# Patient Record
Sex: MALE | Race: WHITE | NOT HISPANIC OR LATINO | Employment: FULL TIME | URBAN - METROPOLITAN AREA
[De-identification: names, ages, dates, MRNs, and addresses within clinical notes are randomized per-mention and may not be internally consistent; named-entity substitution may affect disease eponyms.]

---

## 2020-12-02 ENCOUNTER — LAB VISIT (OUTPATIENT)
Dept: LAB | Facility: HOSPITAL | Age: 45
End: 2020-12-02
Attending: INTERNAL MEDICINE
Payer: COMMERCIAL

## 2020-12-02 ENCOUNTER — OFFICE VISIT (OUTPATIENT)
Dept: INTERNAL MEDICINE | Facility: CLINIC | Age: 45
End: 2020-12-02
Payer: COMMERCIAL

## 2020-12-02 VITALS
OXYGEN SATURATION: 97 % | HEART RATE: 89 BPM | BODY MASS INDEX: 30.36 KG/M2 | HEIGHT: 73 IN | DIASTOLIC BLOOD PRESSURE: 72 MMHG | SYSTOLIC BLOOD PRESSURE: 118 MMHG | WEIGHT: 229.06 LBS

## 2020-12-02 DIAGNOSIS — K62.5 RECTAL BLEEDING: ICD-10-CM

## 2020-12-02 DIAGNOSIS — K62.5 RECTAL BLEEDING: Primary | ICD-10-CM

## 2020-12-02 LAB
BASOPHILS # BLD AUTO: 0.03 K/UL (ref 0–0.2)
BASOPHILS NFR BLD: 0.4 % (ref 0–1.9)
DIFFERENTIAL METHOD: ABNORMAL
EOSINOPHIL # BLD AUTO: 0.1 K/UL (ref 0–0.5)
EOSINOPHIL NFR BLD: 1.3 % (ref 0–8)
ERYTHROCYTE [DISTWIDTH] IN BLOOD BY AUTOMATED COUNT: 12.5 % (ref 11.5–14.5)
HCT VFR BLD AUTO: 45.2 % (ref 40–54)
HGB BLD-MCNC: 15.1 G/DL (ref 14–18)
IMM GRANULOCYTES # BLD AUTO: 0.02 K/UL (ref 0–0.04)
IMM GRANULOCYTES NFR BLD AUTO: 0.3 % (ref 0–0.5)
LYMPHOCYTES # BLD AUTO: 2.4 K/UL (ref 1–4.8)
LYMPHOCYTES NFR BLD: 34.5 % (ref 18–48)
MCH RBC QN AUTO: 29.8 PG (ref 27–31)
MCHC RBC AUTO-ENTMCNC: 33.4 G/DL (ref 32–36)
MCV RBC AUTO: 89 FL (ref 82–98)
MONOCYTES # BLD AUTO: 0.5 K/UL (ref 0.3–1)
MONOCYTES NFR BLD: 7 % (ref 4–15)
NEUTROPHILS # BLD AUTO: 3.9 K/UL (ref 1.8–7.7)
NEUTROPHILS NFR BLD: 56.5 % (ref 38–73)
NRBC BLD-RTO: 0 /100 WBC
PLATELET # BLD AUTO: 198 K/UL (ref 150–350)
PMV BLD AUTO: 9.1 FL (ref 9.2–12.9)
RBC # BLD AUTO: 5.07 M/UL (ref 4.6–6.2)
WBC # BLD AUTO: 6.96 K/UL (ref 3.9–12.7)

## 2020-12-02 PROCEDURE — 85025 COMPLETE CBC W/AUTO DIFF WBC: CPT

## 2020-12-02 PROCEDURE — 36415 COLL VENOUS BLD VENIPUNCTURE: CPT

## 2020-12-02 PROCEDURE — 1126F AMNT PAIN NOTED NONE PRSNT: CPT | Mod: S$GLB,,, | Performed by: INTERNAL MEDICINE

## 2020-12-02 PROCEDURE — 99202 PR OFFICE/OUTPT VISIT, NEW, LEVL II, 15-29 MIN: ICD-10-PCS | Mod: S$GLB,,, | Performed by: INTERNAL MEDICINE

## 2020-12-02 PROCEDURE — 3008F BODY MASS INDEX DOCD: CPT | Mod: CPTII,S$GLB,, | Performed by: INTERNAL MEDICINE

## 2020-12-02 PROCEDURE — 3008F PR BODY MASS INDEX (BMI) DOCUMENTED: ICD-10-PCS | Mod: CPTII,S$GLB,, | Performed by: INTERNAL MEDICINE

## 2020-12-02 PROCEDURE — 99202 OFFICE O/P NEW SF 15 MIN: CPT | Mod: S$GLB,,, | Performed by: INTERNAL MEDICINE

## 2020-12-02 PROCEDURE — 99999 PR PBB SHADOW E&M-NEW PATIENT-LVL III: ICD-10-PCS | Mod: PBBFAC,,, | Performed by: INTERNAL MEDICINE

## 2020-12-02 PROCEDURE — 99999 PR PBB SHADOW E&M-NEW PATIENT-LVL III: CPT | Mod: PBBFAC,,, | Performed by: INTERNAL MEDICINE

## 2020-12-02 PROCEDURE — 1126F PR PAIN SEVERITY QUANTIFIED, NO PAIN PRESENT: ICD-10-PCS | Mod: S$GLB,,, | Performed by: INTERNAL MEDICINE

## 2020-12-02 RX ORDER — HYDROCORTISONE ACETATE 25 MG/1
25 SUPPOSITORY RECTAL 2 TIMES DAILY
Qty: 20 SUPPOSITORY | Refills: 0 | Status: SHIPPED | OUTPATIENT
Start: 2020-12-02 | End: 2020-12-02 | Stop reason: SDUPTHER

## 2020-12-02 RX ORDER — HYDROCORTISONE ACETATE 25 MG/1
25 SUPPOSITORY RECTAL 2 TIMES DAILY
Qty: 6 SUPPOSITORY | Refills: 0 | Status: SHIPPED | OUTPATIENT
Start: 2020-12-02 | End: 2020-12-05

## 2020-12-02 NOTE — PROGRESS NOTES
45-year-old male  Started a month ago he has had episodes of rectal bleeding or bright red blood per rectum.  Hearing noticed bright red blood on top of the stool, not mixed in with the stool.  Is happen about couple times a week.  One time he notices soreness around the rectum.  There has been no change in bowel function and has been no difficulty with defecating.  There has been no abdominal pain , weight changes, nausea vomiting.    Medical history  No major health conditions    Family and social history is outlined in the med card    Examine examination  Weight 229 lb  Blood pressure 118/72  Pulse 88  Chest clear breath sounds  Heart regular rate rhythm  Abdominal exam nontender soft no hepatosplenomegaly abdominal masses  Rectal exam   no external lesions, mi openingld area of redness near the anterior aspect of the anal, no palpable lesions, stool is brown heme-negative, prostate normal    Impression  Rectal bleeding-bright red blood per rectum, probably due to internal hemorrhoid bleed    Plan  CBC  Referred to colon rectal surgery since it is recurring  Trial of Anusol HC suppository for few days

## 2020-12-30 ENCOUNTER — LAB VISIT (OUTPATIENT)
Dept: PRIMARY CARE CLINIC | Facility: OTHER | Age: 45
End: 2020-12-30
Attending: INTERNAL MEDICINE
Payer: COMMERCIAL

## 2020-12-30 DIAGNOSIS — Z03.818 ENCOUNTER FOR OBSERVATION FOR SUSPECTED EXPOSURE TO OTHER BIOLOGICAL AGENTS RULED OUT: ICD-10-CM

## 2020-12-30 PROCEDURE — U0003 INFECTIOUS AGENT DETECTION BY NUCLEIC ACID (DNA OR RNA); SEVERE ACUTE RESPIRATORY SYNDROME CORONAVIRUS 2 (SARS-COV-2) (CORONAVIRUS DISEASE [COVID-19]), AMPLIFIED PROBE TECHNIQUE, MAKING USE OF HIGH THROUGHPUT TECHNOLOGIES AS DESCRIBED BY CMS-2020-01-R: HCPCS

## 2020-12-31 LAB — SARS-COV-2 RNA RESP QL NAA+PROBE: NOT DETECTED

## 2021-09-29 ENCOUNTER — HOSPITAL ENCOUNTER (OUTPATIENT)
Dept: RADIOLOGY | Facility: HOSPITAL | Age: 46
Discharge: HOME | End: 2021-09-29
Attending: PHYSICIAN ASSISTANT
Payer: COMMERCIAL

## 2021-09-29 ENCOUNTER — OFFICE VISIT (OUTPATIENT)
Dept: SPORTS MEDICINE | Facility: CLINIC | Age: 46
End: 2021-09-29
Payer: COMMERCIAL

## 2021-09-29 VITALS
TEMPERATURE: 98 F | HEIGHT: 73 IN | DIASTOLIC BLOOD PRESSURE: 88 MMHG | HEART RATE: 85 BPM | WEIGHT: 224 LBS | BODY MASS INDEX: 29.69 KG/M2 | SYSTOLIC BLOOD PRESSURE: 135 MMHG

## 2021-09-29 DIAGNOSIS — M25.512 ACUTE PAIN OF LEFT SHOULDER: Primary | ICD-10-CM

## 2021-09-29 DIAGNOSIS — M25.512 LEFT SHOULDER PAIN, UNSPECIFIED CHRONICITY: ICD-10-CM

## 2021-09-29 DIAGNOSIS — M25.812 SHOULDER IMPINGEMENT, LEFT: ICD-10-CM

## 2021-09-29 PROCEDURE — 1159F PR MEDICATION LIST DOCUMENTED IN MEDICAL RECORD: ICD-10-PCS | Mod: CPTII,S$GLB,, | Performed by: PHYSICIAN ASSISTANT

## 2021-09-29 PROCEDURE — 73030 X-RAY EXAM OF SHOULDER: CPT | Mod: 26,LT,, | Performed by: RADIOLOGY

## 2021-09-29 PROCEDURE — 99204 OFFICE O/P NEW MOD 45 MIN: CPT | Mod: S$GLB,,, | Performed by: PHYSICIAN ASSISTANT

## 2021-09-29 PROCEDURE — 3075F SYST BP GE 130 - 139MM HG: CPT | Mod: CPTII,S$GLB,, | Performed by: PHYSICIAN ASSISTANT

## 2021-09-29 PROCEDURE — 1160F PR REVIEW ALL MEDS BY PRESCRIBER/CLIN PHARMACIST DOCUMENTED: ICD-10-PCS | Mod: CPTII,S$GLB,, | Performed by: PHYSICIAN ASSISTANT

## 2021-09-29 PROCEDURE — 3079F PR MOST RECENT DIASTOLIC BLOOD PRESSURE 80-89 MM HG: ICD-10-PCS | Mod: CPTII,S$GLB,, | Performed by: PHYSICIAN ASSISTANT

## 2021-09-29 PROCEDURE — 99204 PR OFFICE/OUTPT VISIT, NEW, LEVL IV, 45-59 MIN: ICD-10-PCS | Mod: S$GLB,,, | Performed by: PHYSICIAN ASSISTANT

## 2021-09-29 PROCEDURE — 73030 X-RAY EXAM OF SHOULDER: CPT | Mod: TC,LT

## 2021-09-29 PROCEDURE — 73030 XR SHOULDER COMPLETE 2 OR MORE VIEWS LEFT: ICD-10-PCS | Mod: 26,LT,, | Performed by: RADIOLOGY

## 2021-09-29 PROCEDURE — 99999 PR PBB SHADOW E&M-EST. PATIENT-LVL III: ICD-10-PCS | Mod: PBBFAC,,, | Performed by: PHYSICIAN ASSISTANT

## 2021-09-29 PROCEDURE — 1160F RVW MEDS BY RX/DR IN RCRD: CPT | Mod: CPTII,S$GLB,, | Performed by: PHYSICIAN ASSISTANT

## 2021-09-29 PROCEDURE — 3008F BODY MASS INDEX DOCD: CPT | Mod: CPTII,S$GLB,, | Performed by: PHYSICIAN ASSISTANT

## 2021-09-29 PROCEDURE — 3008F PR BODY MASS INDEX (BMI) DOCUMENTED: ICD-10-PCS | Mod: CPTII,S$GLB,, | Performed by: PHYSICIAN ASSISTANT

## 2021-09-29 PROCEDURE — 3079F DIAST BP 80-89 MM HG: CPT | Mod: CPTII,S$GLB,, | Performed by: PHYSICIAN ASSISTANT

## 2021-09-29 PROCEDURE — 1159F MED LIST DOCD IN RCRD: CPT | Mod: CPTII,S$GLB,, | Performed by: PHYSICIAN ASSISTANT

## 2021-09-29 PROCEDURE — 99999 PR PBB SHADOW E&M-EST. PATIENT-LVL III: CPT | Mod: PBBFAC,,, | Performed by: PHYSICIAN ASSISTANT

## 2021-09-29 PROCEDURE — 3075F PR MOST RECENT SYSTOLIC BLOOD PRESS GE 130-139MM HG: ICD-10-PCS | Mod: CPTII,S$GLB,, | Performed by: PHYSICIAN ASSISTANT

## 2021-10-06 ENCOUNTER — HOSPITAL ENCOUNTER (OUTPATIENT)
Dept: RADIOLOGY | Facility: HOSPITAL | Age: 46
Discharge: HOME | End: 2021-10-06
Attending: PHYSICIAN ASSISTANT
Payer: COMMERCIAL

## 2021-10-06 DIAGNOSIS — M25.512 ACUTE PAIN OF LEFT SHOULDER: ICD-10-CM

## 2021-10-06 PROCEDURE — G1004 CDSM NDSC: HCPCS

## 2021-10-06 PROCEDURE — 73221 MRI SHOULDER WITHOUT CONTRAST LEFT: ICD-10-PCS | Mod: 26,LT,, | Performed by: INTERNAL MEDICINE

## 2021-10-06 PROCEDURE — 73221 MRI JOINT UPR EXTREM W/O DYE: CPT | Mod: 26,LT,, | Performed by: INTERNAL MEDICINE

## 2021-10-13 ENCOUNTER — TELEPHONE (OUTPATIENT)
Dept: SPORTS MEDICINE | Facility: CLINIC | Age: 46
End: 2021-10-13

## 2021-10-13 ENCOUNTER — PATIENT MESSAGE (OUTPATIENT)
Dept: SPORTS MEDICINE | Facility: CLINIC | Age: 46
End: 2021-10-13
Payer: COMMERCIAL

## 2021-10-18 ENCOUNTER — PATIENT MESSAGE (OUTPATIENT)
Dept: INTERNAL MEDICINE | Facility: CLINIC | Age: 46
End: 2021-10-18
Payer: COMMERCIAL

## 2021-11-03 DIAGNOSIS — Z01.818 PRE-OP TESTING: Primary | ICD-10-CM

## 2021-11-04 DIAGNOSIS — S46.012A TRAUMATIC TEAR OF LEFT ROTATOR CUFF, UNSPECIFIED TEAR EXTENT, INITIAL ENCOUNTER: Primary | ICD-10-CM

## 2021-11-04 DIAGNOSIS — M75.20 BICEPS TENDINITIS, UNSPECIFIED LATERALITY: ICD-10-CM

## 2021-11-04 DIAGNOSIS — M19.90 OSTEOARTHRITIS, UNSPECIFIED OSTEOARTHRITIS TYPE, UNSPECIFIED SITE: ICD-10-CM

## 2021-11-04 DIAGNOSIS — S42.252A CLOSED DISPLACED FRACTURE OF GREATER TUBEROSITY OF LEFT HUMERUS, INITIAL ENCOUNTER: ICD-10-CM

## 2021-11-04 DIAGNOSIS — S43.432A SUPERIOR GLENOID LABRUM LESION OF LEFT SHOULDER, INITIAL ENCOUNTER: ICD-10-CM

## 2021-12-16 ENCOUNTER — PATIENT MESSAGE (OUTPATIENT)
Dept: PREADMISSION TESTING | Facility: HOSPITAL | Age: 46
End: 2021-12-16
Payer: COMMERCIAL

## 2021-12-16 NOTE — ANESTHESIA PAT ROS NOTE
12/16/2021  Maxwell Echeverria is a 46 y.o., male.    Pre-op Assessment          Review of Systems     Planned Procedure: Procedure(s) (LRB):  REPAIR, ROTATOR CUFF, ARTHROSCOPIC (Left)  FIXATION, TENDON (Left)  DEBRIDEMENT, SHOULDER, ARTHROSCOPIC (Left)  MICROFRACTURE (Left)  ORIF, FRACTURE, HUMERUS, PROXIMAL (Left)  Requested Anesthesia Type:General  Surgeon: Marly Heart MD  Service: Orthopedics  Known or anticipated Date of Surgery:1/4/2022    Previous anesthesia records:None on file    PCP pending     6'1  224#

## 2021-12-24 ENCOUNTER — PATIENT MESSAGE (OUTPATIENT)
Dept: SPORTS MEDICINE | Facility: CLINIC | Age: 46
End: 2021-12-24
Payer: COMMERCIAL

## 2021-12-27 ENCOUNTER — PATIENT MESSAGE (OUTPATIENT)
Dept: PREADMISSION TESTING | Facility: HOSPITAL | Age: 46
End: 2021-12-27
Payer: COMMERCIAL

## 2021-12-28 ENCOUNTER — PATIENT MESSAGE (OUTPATIENT)
Dept: SPORTS MEDICINE | Facility: CLINIC | Age: 46
End: 2021-12-28
Payer: COMMERCIAL

## 2021-12-29 DIAGNOSIS — Z01.818 PRE-OP TESTING: Primary | ICD-10-CM

## 2022-01-03 ENCOUNTER — OFFICE VISIT (OUTPATIENT)
Dept: SPORTS MEDICINE | Facility: CLINIC | Age: 47
End: 2022-01-03
Payer: COMMERCIAL

## 2022-01-03 VITALS
WEIGHT: 234 LBS | SYSTOLIC BLOOD PRESSURE: 152 MMHG | HEART RATE: 57 BPM | DIASTOLIC BLOOD PRESSURE: 92 MMHG | BODY MASS INDEX: 31.01 KG/M2 | HEIGHT: 73 IN

## 2022-01-03 DIAGNOSIS — S46.012A TRAUMATIC TEAR OF LEFT ROTATOR CUFF, UNSPECIFIED TEAR EXTENT, INITIAL ENCOUNTER: Primary | ICD-10-CM

## 2022-01-03 DIAGNOSIS — G89.18 POST-OPERATIVE PAIN: ICD-10-CM

## 2022-01-03 DIAGNOSIS — M75.20 BICEPS TENDINITIS, UNSPECIFIED LATERALITY: ICD-10-CM

## 2022-01-03 DIAGNOSIS — S43.432A SUPERIOR GLENOID LABRUM LESION OF LEFT SHOULDER, INITIAL ENCOUNTER: ICD-10-CM

## 2022-01-03 DIAGNOSIS — M19.90 OSTEOARTHRITIS, UNSPECIFIED OSTEOARTHRITIS TYPE, UNSPECIFIED SITE: ICD-10-CM

## 2022-01-03 LAB
CTP QC/QA: YES
SARS-COV-2 RDRP RESP QL NAA+PROBE: NEGATIVE

## 2022-01-03 PROCEDURE — 3008F BODY MASS INDEX DOCD: CPT | Mod: CPTII,S$GLB,, | Performed by: ORTHOPAEDIC SURGERY

## 2022-01-03 PROCEDURE — 3077F PR MOST RECENT SYSTOLIC BLOOD PRESSURE >= 140 MM HG: ICD-10-PCS | Mod: CPTII,S$GLB,, | Performed by: ORTHOPAEDIC SURGERY

## 2022-01-03 PROCEDURE — 3080F DIAST BP >= 90 MM HG: CPT | Mod: CPTII,S$GLB,, | Performed by: ORTHOPAEDIC SURGERY

## 2022-01-03 PROCEDURE — 3008F PR BODY MASS INDEX (BMI) DOCUMENTED: ICD-10-PCS | Mod: CPTII,S$GLB,, | Performed by: ORTHOPAEDIC SURGERY

## 2022-01-03 PROCEDURE — 99214 OFFICE O/P EST MOD 30 MIN: CPT | Mod: 57,S$GLB,, | Performed by: ORTHOPAEDIC SURGERY

## 2022-01-03 PROCEDURE — 3080F PR MOST RECENT DIASTOLIC BLOOD PRESSURE >= 90 MM HG: ICD-10-PCS | Mod: CPTII,S$GLB,, | Performed by: ORTHOPAEDIC SURGERY

## 2022-01-03 PROCEDURE — 99999 PR PBB SHADOW E&M-EST. PATIENT-LVL III: CPT | Mod: PBBFAC,,, | Performed by: ORTHOPAEDIC SURGERY

## 2022-01-03 PROCEDURE — 1160F PR REVIEW ALL MEDS BY PRESCRIBER/CLIN PHARMACIST DOCUMENTED: ICD-10-PCS | Mod: CPTII,S$GLB,, | Performed by: ORTHOPAEDIC SURGERY

## 2022-01-03 PROCEDURE — 1159F PR MEDICATION LIST DOCUMENTED IN MEDICAL RECORD: ICD-10-PCS | Mod: CPTII,S$GLB,, | Performed by: ORTHOPAEDIC SURGERY

## 2022-01-03 PROCEDURE — 1159F MED LIST DOCD IN RCRD: CPT | Mod: CPTII,S$GLB,, | Performed by: ORTHOPAEDIC SURGERY

## 2022-01-03 PROCEDURE — 99214 PR OFFICE/OUTPT VISIT, EST, LEVL IV, 30-39 MIN: ICD-10-PCS | Mod: 57,S$GLB,, | Performed by: ORTHOPAEDIC SURGERY

## 2022-01-03 PROCEDURE — 1160F RVW MEDS BY RX/DR IN RCRD: CPT | Mod: CPTII,S$GLB,, | Performed by: ORTHOPAEDIC SURGERY

## 2022-01-03 PROCEDURE — 3077F SYST BP >= 140 MM HG: CPT | Mod: CPTII,S$GLB,, | Performed by: ORTHOPAEDIC SURGERY

## 2022-01-03 PROCEDURE — 99999 PR PBB SHADOW E&M-EST. PATIENT-LVL III: ICD-10-PCS | Mod: PBBFAC,,, | Performed by: ORTHOPAEDIC SURGERY

## 2022-01-03 RX ORDER — OXYCODONE HCL 10 MG/1
10 TABLET, FILM COATED, EXTENDED RELEASE ORAL
Status: CANCELLED | OUTPATIENT
Start: 2022-01-03 | End: 2022-01-03

## 2022-01-03 RX ORDER — ASPIRIN 81 MG/1
81 TABLET ORAL DAILY
Qty: 28 TABLET | Refills: 0 | COMMUNITY
Start: 2022-01-03 | End: 2022-10-24

## 2022-01-03 RX ORDER — PREGABALIN 75 MG/1
75 CAPSULE ORAL
Status: CANCELLED | OUTPATIENT
Start: 2022-01-03 | End: 2022-01-03

## 2022-01-03 RX ORDER — PROMETHAZINE HYDROCHLORIDE 25 MG/1
25 TABLET ORAL EVERY 6 HOURS PRN
Qty: 12 TABLET | Refills: 0 | Status: SHIPPED | OUTPATIENT
Start: 2022-01-03 | End: 2022-10-24

## 2022-01-03 RX ORDER — CLINDAMYCIN PHOSPHATE 900 MG/50ML
900 INJECTION, SOLUTION INTRAVENOUS
Status: CANCELLED | OUTPATIENT
Start: 2022-01-03

## 2022-01-03 RX ORDER — TRAMADOL HYDROCHLORIDE 50 MG/1
50-100 TABLET ORAL EVERY 6 HOURS PRN
Qty: 21 TABLET | Refills: 0 | Status: SHIPPED | OUTPATIENT
Start: 2022-01-03 | End: 2022-10-24

## 2022-01-03 RX ORDER — SODIUM CHLORIDE 9 MG/ML
INJECTION, SOLUTION INTRAVENOUS CONTINUOUS
Status: CANCELLED | OUTPATIENT
Start: 2022-01-03

## 2022-01-03 RX ORDER — OXYCODONE AND ACETAMINOPHEN 10; 325 MG/1; MG/1
TABLET ORAL
Qty: 21 TABLET | Refills: 0 | Status: SHIPPED | OUTPATIENT
Start: 2022-01-03 | End: 2022-10-24

## 2022-01-03 NOTE — PROGRESS NOTES
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   RHD     He reports that his shoulder pain began 2.5 weeks ago following lifting and moving a 30lb case of liquor. He felt immediate sharp pain in his shoulder and reports that his shoulder gave out of him. Since then he reports anterior and lateral sharp stabbing pain that is made worse with activity. Pain is keeping him awake at night. Not improving with conservative management.  He has been doing ice compresses with no significant pain relief.     He reports that the pain is worse with overhead and lateral activity. It also bothers him at night.    SANE 30-40    He reports that prior to this injury (4-5 months ago) he noted a hard bone protrusion around his AC joint of his left shoulder that did hurt him for a few days in the past. It is not currently hurting him but he is unsure if this is related to current problem.     Previous HS swimmer.       PAST MEDICAL HISTORY: No past medical history on file.  PAST SURGICAL HISTORY: No past surgical history on file.  FAMILY HISTORY:   Family History   Problem Relation Age of Onset    Diabetes Father     Hypertension Father     Cancer Brother         testicular     SOCIAL HISTORY:   Social History     Socioeconomic History    Marital status:    Tobacco Use    Smoking status: Never Smoker    Smokeless tobacco: Never Used   Substance and Sexual Activity    Alcohol use: Yes     Comment: 5 drinks weekend       MEDICATIONS: No current outpatient medications on file.  ALLERGIES: Review of patient's allergies indicates:  No Known Allergies    VITAL SIGNS: BP (!) 152/92   Pulse (!) 57   Ht 6' 1" (1.854 m)   Wt 106.1 kg (234 lb)   BMI 30.87 kg/m²      Review of Systems   Constitution: Negative. Negative for chills, fever and night sweats.   HENT: Negative for congestion and " headaches.    Eyes: Negative for blurred vision, left vision loss and right vision loss.   Cardiovascular: Negative for chest pain and syncope.   Respiratory: Negative for cough and shortness of breath.    Endocrine: Negative for polydipsia, polyphagia and polyuria.   Hematologic/Lymphatic: Negative for bleeding problem. Does not bruise/bleed easily.   Skin: Negative for dry skin, itching and rash.   Musculoskeletal: Negative for falls and muscle weakness.   Gastrointestinal: Negative for abdominal pain and bowel incontinence.   Genitourinary: Negative for bladder incontinence and nocturia.   Neurological: Negative for disturbances in coordination, loss of balance and seizures.   Psychiatric/Behavioral: Negative for depression. The patient does not have insomnia.    Allergic/Immunologic: Negative for hives and persistent infections.       PHYSICAL EXAMINATION:  General:  The patient is alert and oriented x 3.  Mood is pleasant.  Observation of ears, eyes and nose reveal no gross abnormalities.  HEENT: NCAT, sclera nonicteric  Lungs: Respirations are equal and unlabored.  Gait is coordinated. Patient can toe walk and heel walk without difficulty.  Cardiovascular: There are no swelling or varicosities present.   Lymphatic: Negative for adenopathy       left SHOULDER / UPPER EXTREMITY EXAM    OBSERVATION:     Swelling  none  Deformity  none   Discoloration  none   Scapular winging none   Scars   none  Atrophy  none    TENDERNESS / CREPITUS (T/C):          T/C      T/C   Clavicle   -/-  SUPRAspinatus    -/-   AC Jt.    - pain today with hypertrophy noted/-     INFRAspinatus  -/-   SC Jt.    -/-  Deltoid    -/-   G. Tuberosity  -/-  LH BICEP groove  +/-   Acromion:  -/-  Midline Neck   -/-   Scapular Spine -/-  Trapezium   -/-   SMA Scapula  -/-  GH jt. line - post  +/-   Scapulothoracic  -/-         ROM: (* = with pain)  Right shoulder   Left shoulder        AROM (PROM)   AROM (PROM)   FE    170° (175°)     170°  (175°) *    ER at 0°    60°  (65°)    60°  (65°) *   ER at 90° ABD  90°  (90°)    90°  (90°)*   IR at 90°  ABD   NA  (40°)     NA  (40°)      IR (spine level)   T10     T12*    STRENGTH: (* = with pain) RIGHT SHOULDER  LEFT SHOULDER   SCAPTION at 0 *  5/5    5-/5*    SCAPTION at 30 *  5/5    5/5*    IR    5/5    5/5   ER    5/5    5/5   BICEPS   5/5    5/5   Deltoid    5/5    5/5    Bear hug test    + for pain and weakness 4/5 on left and 5/5 on right     SIGNS:  Painful side       NEER   +    OCELESTINES  +   MERINO   +    SPEEDS  +   DROP ARM   neg   BELLY PRESS Neg   Superior escape none    LIFT-OFF  Neg   X-Body ADD    neg    MOVING VALGUS Neg      STABILITY TESTING    RIGHT SHOULDER   LEFT SHOULDER       Translation    Anterior  up face     up face    Posterior  up face    up face    Sulcus   < 10mm    < 10 mm        EXTREMITY NEURO-VASCULAR EXAM    Sensation grossly intact to light touch all dermatomal regions.    DTR 2+ Biceps, Triceps, BR and Negative Graces sign   Grossly intact motor function at Elbow, Wrist and Hand   Distal pulses radial and ulnar 2+, brisk cap refill, symmetric.      NECK:  Painless FROM and spinous processes non-tender. Negative Spurlings sign.      OTHER FINDINGS:    XRAYS:  Shoulder trauma series left,  were ordered and reviewed by me. No convincing fracture or dislocation is noted. The osseous structures appear well mineralized and well aligned, possible insufficiency fracture greater tuberosity/cystic changes, +AC moderate hypertrophy, mild degenerative changes    left   1. Shoulder pain,possible RTC tear    2.  Possible insufficiency fracture greater tuberosity    Concern for subscapularis tear and supraspinatus tear    Plan:       ASSESSMENT:  shoulder pain.    I do think that this is likely a rotator cuff tear, possible insufficiency fracture greater tuberosity.    I have recommended we check an MRI of the shoulder to evaluate this.    Depending on the results of the MRI,  we may consider a cortisone   injection and physical therapy and/or arthroscopic intervention and treatment   depending on what we find.  I will see him back upon its completion or PHREV and we will consider the above.    left   1. Shoulder pain,possible medium size partial rotator cuff tear   2.  Possible insufficiency fracture greater tuberosity    MRI Left shoulder: Medium size partial thickness supraspinatus tear without atrophy or retraction. Subscapularis tendinopathy, SLAP, biceps tendinitis, + insufficiency fracture greater tuberosity, +AC moderate hypertrophy, labrum degenerative changes.      Full-thickness fissuring and flap formation involving the anteroinferior glenoid cartilage.  1.4 cm full-thickness cartilage defect over the posterior humeral head.   Labral and cartilage abnormalities are suggestive of prior anterior shoulder dislocation.    MRI reviewed and discussed with Dr. Heart. Surgical plan developed.      ASSESSMENT:    Left Shoulder Rotator Cuff Tear, SLAP, biceps tendonitis.      he would benefit from an arthroscopy, given the above.       PLAN:   Left Shoulder rotator cuff tear     All questions were answered, pt will contact us for questions or concerns in the interim.  Had thorough discussion of non-operative vs operative intervention, and risks and benefits of both.     We have discussed the surgery and recovery of arthroscopic shoulder surgery. he understands that there may be limited mobility up to several weeks after surgery depending on procedures that are performed at the time of surgery.    Surgical and non-surgical treatment options discussed along with risks and benefits of each. Patient has chosen surgical management.     The spectrum of treatment options were discussed with the patient, including nonoperative and operative options.  After thorough discussion, the patient has elected to undergo surgical treatment to include:    left   a. Shoulder arthroscopic rotator cuff  debridement vs repair with Cuffmend   b. Shoulder arthroscopic SAD   c. Shoulder arthroscopic extensive debridement   d. Shoulder arthroscopic biceps tenodesis (vs. subpect tenodesis)   e. Shoulder arthroscopic possible microfracture   f. possible Shoulder open reduction internal fixation greater tuberosity    The details of the surgical procedure were explained, including the location of probable incisions and a description of likely hardware and/or grafts to be used.  The patient understands the likely convalescence after surgery.  Also, we have thoroughly discussed the risks, benefits and alternatives to surgery, including, but not limited to, the risk of infection, joint stiffness, blood clot (including DVT and/or pulmonary embolus), neurologic and vascular injury.  It was explained that, if tissue has been repaired or reconstructed, there is a chance of failure, which may require further management.  Consent form for surgery is signed and in chart.    These risks include but are not limited to: bleeding, infection, scarring, re-tear of repair, irreparability of the tear, damage to neurovascular structures, damage to cartilage, stiffness, blood clots, pulmonary embolism, swelling, compartment syndrome, need for further surgery, and the risks of anesthesia. She verbalized her understanding of these risks and wished to proceed with surgery.   Time was spent face-to-face with the patient during this encounter on counseling about treatment options including surgery and coordination of her care for preoperative visits, surgery and post-operative rehab.

## 2022-01-03 NOTE — H&P (VIEW-ONLY)
Maxwell Echeverria  is here for a completion of his perioperative paperwork. he  Is scheduled to undergo     left              a. Shoulder arthroscopic rotator cuff debridement vs repair with Cuffmend              b. Shoulder arthroscopic SAD              c. Shoulder arthroscopic extensive debridement              d. Shoulder arthroscopic biceps tenodesis (vs. subpect tenodesis)              e. Shoulder arthroscopic possible microfracture              F. possible Shoulder open reduction internal fixation greater tuberosity on 1/4/21.      He is a healthy individual and does not need clearance for this procedure.     PAST MEDICAL HISTORY: History reviewed. No pertinent past medical history.  PAST SURGICAL HISTORY: History reviewed. No pertinent surgical history.  FAMILY HISTORY:   Family History   Problem Relation Age of Onset    Diabetes Father     Hypertension Father     Cancer Brother         testicular     SOCIAL HISTORY:   Social History     Socioeconomic History    Marital status:    Tobacco Use    Smoking status: Never Smoker    Smokeless tobacco: Never Used   Substance and Sexual Activity    Alcohol use: Yes     Comment: 5 drinks weekend       MEDICATIONS:   Current Outpatient Medications:     aspirin (ECOTRIN) 81 MG EC tablet, Take 1 tablet (81 mg total) by mouth once daily. For 4 weeks starting the day after surgery., Disp: 28 tablet, Rfl: 0    oxyCODONE-acetaminophen (PERCOCET)  mg per tablet, Take 1 tablet by mouth every 4-6 hours as needed for pain. Take stool softener with this medication., Disp: 21 tablet, Rfl: 0    promethazine (PHENERGAN) 25 MG tablet, Take 1 tablet (25 mg total) by mouth every 6 (six) hours as needed for Nausea., Disp: 12 tablet, Rfl: 0    traMADoL (ULTRAM) 50 mg tablet, Take 1-2 tablets ( mg total) by mouth every 6 (six) hours as needed for Pain., Disp: 21 tablet, Rfl: 0  ALLERGIES: Review of patient's allergies indicates:  No Known Allergies    VITAL SIGNS: BP  "(!) 152/92   Pulse (!) 57   Ht 6' 1" (1.854 m)   Wt 106.1 kg (234 lb)   BMI 30.87 kg/m²      Risks, indications and benefits of the surgical procedure were discussed with the patient. All questions with regard to surgery, rehab, expected return to functional activities, activities of daily living and recreational endeavors were answered to his satisfaction.    It was explained to the patient that there may be an increase in surgical risks if the patient has certain co-morbidities such as but not limited to: Obesity, Cardiovascular issues (CHF, CAD, Arrhythmias), chronic pulmonary issues, previous or current neurovascular/neurological issues, previous strokes, diabetes mellitus, previous wound healing issues, previous wound or skin infections, PVD, clotting disorders, if the patient uses chronic steroids, if the patient takes or has immune compromising medications or diseases, or has previously or currently used tobacco products.     The patient verbalized that he/she does not have any additional clotting, bleeding, or blood disorders, other than what is list in her chart on today's review.     Then a brief history and physical exam were performed.    Review of Systems   Constitution: Negative. Negative for chills, fever and night sweats.   HENT: Negative for congestion and headaches.    Eyes: Negative for blurred vision, left vision loss and right vision loss.   Cardiovascular: Negative for chest pain and syncope.   Respiratory: Negative for cough and shortness of breath.    Endocrine: Negative for polydipsia, polyphagia and polyuria.   Hematologic/Lymphatic: Negative for bleeding problem. Does not bruise/bleed easily.   Skin: Negative for dry skin, itching and rash.   Musculoskeletal: Negative for falls and muscle weakness.   Gastrointestinal: Negative for abdominal pain and bowel incontinence.   Genitourinary: Negative for bladder incontinence and nocturia.   Neurological: Negative for disturbances in " coordination, loss of balance and seizures.   Psychiatric/Behavioral: Negative for depression. The patient does not have insomnia.    Allergic/Immunologic: Negative for hives and persistent infections.     PHYSICAL EXAM:  GEN: A&Ox3, WD WN NAD  HEENT: WNL  CHEST: CTAB, no W/R/R  HEART: RRR, no M/R/G  ABD: Soft, NT ND, BS x4 QUADS  MS; See Epic  NEURO: CN II-XII intact       The surgical consent was then reviewed with the patient, who agreed with all the contents of the consent form and it was signed. he was then given the surgery packet to bring with him to [facility] [place] for the anesthesia portion of his perioperative paperwork (if needed)   For all physicians except for Dr. An, we will email and possibly fax the consent forms and booking sheets to [facility].    The patient was given the opportunity to ask questions about the surgical plan and consent form, and once no other questions were asked, I proceeded with the pre-op appointment.    PHYSICAL THERAPY:  He was also instructed regarding physical therapy and will begin on  Likely 7-10 days. He was given a copy of the original prescription to schedule. Another copy of this prescription was also faxed to [facility].    POST OP CARE:instructions were reviewed including care of the wound and dressing after surgery and when he can shower. Patient was told not sleep or lay on there surgical extremity following surgery as this could cause repair damage, tissue damage, or nerve injury.    An extensive amount of time was spent on discussion of the following information based on what type of surgery the patient was having. Patient expressed understanding of the material below:    Shoulder surgery or upper extremity surgery requiring post-op sling:  It was explained to the patient that they should remove their arm from the sling approximately 6 times per day to do full elbow ROM (flexion and extension) and full supination and pronation of the  elbow for approximately 5 minutes at a time to help prevent elbow stiffness, nerve pain or problems, or nerve injury. They were told to contact us if they begin having numbness and tingling of there surgical extremity that persists longer then 1 day without relief.     Extremity surgery requiring a splint:   It was explained to patient on how to properly elevated position there extremity to prevent pressure ulcers from occurring. I made sure that the patient understood that that surgical site may be numb following surgery and prevent them from feeling pressure pain that they would normal feel if a pressure injury was occurring. Pressure ulcers and there causes were discussed with the patient today.     Post-operative splint:  It was explain to the patient that they can contact us at anytime if they feel that there is a problem with their splint or under their splint that needs evaluation. If there is concern, questions, or discomfort with the splint then they can present to either our clinic or the [facility] ED for removal, evaluation, and replacement of the splint.    CRUTCHES OR WALKER: It was explained to the patient that if they are having a lower extremity surgery that they will require either a walker or crutches to ambulate safely with after surgery. It was explained that a cane or other assistive devices are not sufficient to safely ambulate with after surgery. I explained to the patient that I will place an order for them to receive either crutches or a walker after surgery to go home with. It was explained that if they have crutches or a walker at home already, that they are REQUIRED to bring them to the hospital on the day of surgery. It was explained that if they do not have them at the hospital on the day of surgery that they WILL be provided a new pair or crutches or a walker to go home with to ensure ambulation will be safe if the patient needs to stop somewhere on the way home.      PAIN  MANAGEMENT: Maxwell Echeverria was also given a pain management regime, which includes the TENS unit given to him by [facility] along with the education required for its use. He was also instructed regarding the Polar ice unit that will be in place after surgery and his postoperative pain medications.     PAIN MEDICATION:  Percocet 10/325mg 1 po q 4-6 hours prn pain  Ultram 50 mg Take 1-2 p.o. q.6 hours p.r.n. breakthrough pain,   Phenergan 25 mg one p.o. q.6 hours p.r.n. nausea and vomiting.    DVT prophylaxis was discussed with the patient today including risk factors for developing DVTs and history of DVTs. The patient was asked if any specific recommendations were given from the doctor/s that did pre-operative surgical clearance. The patient was then given an education sheet about DVTs and PE with warning signs and symptoms of both and steps to take if they suspect either of these.    This along with the Modified Caprini risk assessment model for VTE in general surgical patients was used to determine the patient's DVT risk.     From: Yvette MK, Sonido DA, Erika SM, et al. Prevention of VTE in nonorthopedic surgical patients: antithrombotic therapy and prevention of thrombosis, 9th ed: American College of Chest [employer]icians evidence-based clinical practical guidelines. Chest 2012; 141:e227S. Copyright © 2012. Reproduced with permission from the American College of Chest Physicians.    The below listed DVT prophylaxis regimen along with bilateral TESSIE compression stockings will be used post-op. Length of treatment has been determined to be 10-42 days post-op by the above noted Caprini assessment model.     The patient was instructed to buy and take:  Aspirin 81mg QD x 4 weeks for DVT prophylaxis starting on the morning after surgery.  Patient will also use bilateral TEDs on lower extremities, SCDs during surgery, and early ambulation post-op. If the patient was previously taking 81mg baby aspirin, they were told to not take it  starting 5 days prior to surgery and to restart the 81mg aspirin after surgery.       Patient was also told to buy over the counter Prilosec medication if needed and take it once daily for GI protection as long as they are taking NSAIDs or Aspirin.    Patient denies history of seizures.       The patient was told that narcotic pain medications may make them drowsy and instructions were given to not sign legal documents, drive or operate heavy machinery, cars, or equipment while under the influence of narcotic medications. The patient was told and understands that narcotic pain medications should only be used as needed to control pain and that other options of pain control include TENs unit and ice packs/unit.     As there were no other questions to be asked, he was given my business card along with Marly Heart MD business card if he has any questions or concerns prior to surgery or in the postop period.

## 2022-01-03 NOTE — H&P
Maxwell Echeverria  is here for a completion of his perioperative paperwork. he  Is scheduled to undergo     left              a. Shoulder arthroscopic rotator cuff debridement vs repair with Cuffmend              b. Shoulder arthroscopic SAD              c. Shoulder arthroscopic extensive debridement              d. Shoulder arthroscopic biceps tenodesis (vs. subpect tenodesis)              e. Shoulder arthroscopic possible microfracture              F. possible Shoulder open reduction internal fixation greater tuberosity on 1/4/21.      He is a healthy individual and does not need clearance for this procedure.     PAST MEDICAL HISTORY: History reviewed. No pertinent past medical history.  PAST SURGICAL HISTORY: History reviewed. No pertinent surgical history.  FAMILY HISTORY:   Family History   Problem Relation Age of Onset    Diabetes Father     Hypertension Father     Cancer Brother         testicular     SOCIAL HISTORY:   Social History     Socioeconomic History    Marital status:    Tobacco Use    Smoking status: Never Smoker    Smokeless tobacco: Never Used   Substance and Sexual Activity    Alcohol use: Yes     Comment: 5 drinks weekend       MEDICATIONS:   Current Outpatient Medications:     aspirin (ECOTRIN) 81 MG EC tablet, Take 1 tablet (81 mg total) by mouth once daily. For 4 weeks starting the day after surgery., Disp: 28 tablet, Rfl: 0    oxyCODONE-acetaminophen (PERCOCET)  mg per tablet, Take 1 tablet by mouth every 4-6 hours as needed for pain. Take stool softener with this medication., Disp: 21 tablet, Rfl: 0    promethazine (PHENERGAN) 25 MG tablet, Take 1 tablet (25 mg total) by mouth every 6 (six) hours as needed for Nausea., Disp: 12 tablet, Rfl: 0    traMADoL (ULTRAM) 50 mg tablet, Take 1-2 tablets ( mg total) by mouth every 6 (six) hours as needed for Pain., Disp: 21 tablet, Rfl: 0  ALLERGIES: Review of patient's allergies indicates:  No Known Allergies    VITAL SIGNS: BP  "(!) 152/92   Pulse (!) 57   Ht 6' 1" (1.854 m)   Wt 106.1 kg (234 lb)   BMI 30.87 kg/m²      Risks, indications and benefits of the surgical procedure were discussed with the patient. All questions with regard to surgery, rehab, expected return to functional activities, activities of daily living and recreational endeavors were answered to his satisfaction.    It was explained to the patient that there may be an increase in surgical risks if the patient has certain co-morbidities such as but not limited to: Obesity, Cardiovascular issues (CHF, CAD, Arrhythmias), chronic pulmonary issues, previous or current neurovascular/neurological issues, previous strokes, diabetes mellitus, previous wound healing issues, previous wound or skin infections, PVD, clotting disorders, if the patient uses chronic steroids, if the patient takes or has immune compromising medications or diseases, or has previously or currently used tobacco products.     The patient verbalized that he/she does not have any additional clotting, bleeding, or blood disorders, other than what is list in her chart on today's review.     Then a brief history and physical exam were performed.    Review of Systems   Constitution: Negative. Negative for chills, fever and night sweats.   HENT: Negative for congestion and headaches.    Eyes: Negative for blurred vision, left vision loss and right vision loss.   Cardiovascular: Negative for chest pain and syncope.   Respiratory: Negative for cough and shortness of breath.    Endocrine: Negative for polydipsia, polyphagia and polyuria.   Hematologic/Lymphatic: Negative for bleeding problem. Does not bruise/bleed easily.   Skin: Negative for dry skin, itching and rash.   Musculoskeletal: Negative for falls and muscle weakness.   Gastrointestinal: Negative for abdominal pain and bowel incontinence.   Genitourinary: Negative for bladder incontinence and nocturia.   Neurological: Negative for disturbances in " coordination, loss of balance and seizures.   Psychiatric/Behavioral: Negative for depression. The patient does not have insomnia.    Allergic/Immunologic: Negative for hives and persistent infections.     PHYSICAL EXAM:  GEN: A&Ox3, WD WN NAD  HEENT: WNL  CHEST: CTAB, no W/R/R  HEART: RRR, no M/R/G  ABD: Soft, NT ND, BS x4 QUADS  MS; See Epic  NEURO: CN II-XII intact       The surgical consent was then reviewed with the patient, who agreed with all the contents of the consent form and it was signed. he was then given the surgery packet to bring with him to surgery [place] for the anesthesia portion of his perioperative paperwork (if needed)   For all physicians except for Dr. An, we will email and possibly fax the consent forms and booking sheets to [facility].    The patient was given the opportunity to ask questions about the surgical plan and consent form, and once no other questions were asked, I proceeded with the pre-op appointment.    PHYSICAL THERAPY:  He was also instructed regarding physical therapy and will begin on  Likely 7-10 days. He was given a copy of the original prescription to schedule. [facility]    POST OP CARE:instructions were reviewed including care of the wound and dressing after surgery and when he can shower. Patient was told not sleep or lay on there surgical extremity following surgery as this could cause repair damage, tissue damage, or nerve injury.    An extensive amount of time was spent on discussion of the following information based on what type of surgery the patient was having. Patient expressed understanding of the material below:    Shoulder surgery or upper extremity surgery requiring post-op sling:  It was explained to the patient that they should remove their arm from the sling approximately 6 times per day to do full elbow ROM (flexion and extension) and full supination and pronation of the  elbow for approximately 5 minutes at a time to help prevent elbow stiffness, nerve pain or problems, or nerve injury. They were told to contact us if they begin having numbness and tingling of there surgical extremity that persists longer then 1 day without relief.     Extremity surgery requiring a splint:   It was explained to patient on how to properly elevated position there extremity to prevent pressure ulcers from occurring. I made sure that the patient understood that that surgical site may be numb following surgery and prevent them from feeling pressure pain that they would normal feel if a pressure injury was occurring. Pressure ulcers and there causes were discussed with the patient today.     Post-operative splint:  It was explain to the patient that they can contact us at anytime if they feel that there is a problem with their splint or under their splint that needs evaluation. If there is concern, questions, or discomfort with the splint then they can present to either our clinic or the [facility] ED for removal, evaluation, and replacement of the splint.    CRUTCHES OR WALKER: It was explained to the patient that if they are having a lower extremity surgery that they will require either a walker or crutches to ambulate safely with after surgery. It was explained that a cane or other assistive devices are not sufficient to safely ambulate with after surgery. I explained to the patient that I will place an order for them to receive either crutches or a walker after surgery to go home with. It was explained that if they have crutches or a walker at home already, that they are REQUIRED to bring them to the hospital on the day of surgery. It was explained that if they do not have them at the hospital on the day of surgery that they WILL be provided a new pair or crutches or a walker to go home with to ensure ambulation will be safe if the patient needs to stop somewhere on the way home.      PAIN  MANAGEMENT: Maxwell Echeverria was also given a pain management regime, which includes the TENS unit given to him by [facility] along with the education required for its use. He was also instructed regarding the Polar ice unit that will be in place after surgery and his postoperative pain medications.     PAIN MEDICATION:  Percocet 10/325mg 1 po q 4-6 hours prn pain  Ultram 50 mg Take 1-2 p.o. q.6 hours p.r.n. breakthrough pain,   Phenergan 25 mg one p.o. q.6 hours p.r.n. nausea and vomiting.    DVT prophylaxis was discussed with the patient today including risk factors for developing DVTs and history of DVTs. The patient was asked if any specific recommendations were given from the doctor/s that did pre-operative surgical clearance. The patient was then given an education sheet about DVTs and PE with warning signs and symptoms of both and steps to take if they suspect either of these.    This along with the Modified Caprini risk assessment model for VTE in general surgical patients was used to determine the patient's DVT risk.     From: Yvette MK, Sonido DA, Erika SM, et al. Prevention of VTE in nonorthopedic surgical patients: antithrombotic therapy and prevention of thrombosis, 9th ed: American College of [employer]icians evidence-based clinical practical guidelines. Chest 2012; 141:e227S. Copyright © 2012. Reproduced with permission from the American College of Chest Physicians.    The below listed DVT prophylaxis regimen along with bilateral TESSIE compression stockings will be used post-op. Length of treatment has been determined to be 10-42 days post-op by the above noted Caprini assessment model.     The patient was instructed to buy and take:  Aspirin 81mg QD x 4 weeks for DVT prophylaxis starting on the morning after surgery.  Patient will also use bilateral TEDs on lower extremities, SCDs during surgery, and early ambulation post-op. If the patient was previously taking 81mg baby aspirin, they were told to not take it  starting 5 days prior to surgery and to restart the 81mg aspirin after surgery.       Patient was also told to buy over the counter Prilosec medication if needed and take it once daily for GI protection as long as they are taking NSAIDs or Aspirin.    Patient denies history of seizures.       The patient was told that narcotic pain medications may make them drowsy and instructions were given to not sign legal documents, drive or operate heavy machinery, cars, or equipment while under the influence of narcotic medications. The patient was told and understands that narcotic pain medications should only be used as needed to control pain and that other options of pain control include TENs unit and ice packs/unit.     As there were no other questions to be asked, he was given my business card along with Marly Heart MD business card if he has any questions or concerns prior to surgery or in the postop period.

## 2022-01-04 ENCOUNTER — ANESTHESIA (OUTPATIENT)
Dept: SURGERY | Facility: HOSPITAL | Age: 47
End: 2022-01-04
Payer: COMMERCIAL

## 2022-01-04 ENCOUNTER — ANESTHESIA EVENT (OUTPATIENT)
Dept: SURGERY | Facility: HOSPITAL | Age: 47
End: 2022-01-04
Payer: COMMERCIAL

## 2022-01-04 ENCOUNTER — HOSPITAL ENCOUNTER (OUTPATIENT)
Facility: HOSPITAL | Age: 47
Discharge: HOME | End: 2022-01-04
Attending: ORTHOPAEDIC SURGERY | Admitting: ORTHOPAEDIC SURGERY
Payer: COMMERCIAL

## 2022-01-04 VITALS
BODY MASS INDEX: 31.01 KG/M2 | SYSTOLIC BLOOD PRESSURE: 138 MMHG | RESPIRATION RATE: 18 BRPM | OXYGEN SATURATION: 95 % | WEIGHT: 234 LBS | HEIGHT: 73 IN | HEART RATE: 61 BPM | TEMPERATURE: 98 F | DIASTOLIC BLOOD PRESSURE: 92 MMHG

## 2022-01-04 DIAGNOSIS — S46.012A TRAUMATIC TEAR OF LEFT ROTATOR CUFF, UNSPECIFIED TEAR EXTENT, INITIAL ENCOUNTER: ICD-10-CM

## 2022-01-04 DIAGNOSIS — M75.20 BICEPS TENDINITIS, UNSPECIFIED LATERALITY: ICD-10-CM

## 2022-01-04 DIAGNOSIS — S46.012D TRAUMATIC INCOMPLETE TEAR OF LEFT ROTATOR CUFF, SUBSEQUENT ENCOUNTER: Primary | ICD-10-CM

## 2022-01-04 DIAGNOSIS — M19.90 OSTEOARTHRITIS, UNSPECIFIED OSTEOARTHRITIS TYPE, UNSPECIFIED SITE: ICD-10-CM

## 2022-01-04 PROCEDURE — 37000008 HC ANESTHESIA 1ST 15 MINUTES: Performed by: ORTHOPAEDIC SURGERY

## 2022-01-04 PROCEDURE — D9220A PRA ANESTHESIA: Mod: CRNA,,, | Performed by: NURSE ANESTHETIST, CERTIFIED REGISTERED

## 2022-01-04 PROCEDURE — 29826 SHO ARTHRS SRG DECOMPRESSION: CPT | Mod: LT,,, | Performed by: ORTHOPAEDIC SURGERY

## 2022-01-04 PROCEDURE — D9220A PRA ANESTHESIA: ICD-10-PCS | Mod: CRNA,,, | Performed by: NURSE ANESTHETIST, CERTIFIED REGISTERED

## 2022-01-04 PROCEDURE — 29827 SHO ARTHRS SRG RT8TR CUF RPR: CPT | Mod: LT,,, | Performed by: ORTHOPAEDIC SURGERY

## 2022-01-04 PROCEDURE — 25000003 PHARM REV CODE 250: Performed by: ORTHOPAEDIC SURGERY

## 2022-01-04 PROCEDURE — 63600175 PHARM REV CODE 636 W HCPCS: Performed by: NURSE ANESTHETIST, CERTIFIED REGISTERED

## 2022-01-04 PROCEDURE — 36000711: Performed by: ORTHOPAEDIC SURGERY

## 2022-01-04 PROCEDURE — 36000710: Performed by: ORTHOPAEDIC SURGERY

## 2022-01-04 PROCEDURE — D9220A PRA ANESTHESIA: Mod: ANES,,, | Performed by: ANESTHESIOLOGY

## 2022-01-04 PROCEDURE — 29828 SHO ARTHRS SRG BICP TENODSIS: CPT | Mod: 51,LT,, | Performed by: ORTHOPAEDIC SURGERY

## 2022-01-04 PROCEDURE — 37000009 HC ANESTHESIA EA ADD 15 MINS: Performed by: ORTHOPAEDIC SURGERY

## 2022-01-04 PROCEDURE — 99900035 HC TECH TIME PER 15 MIN (STAT)

## 2022-01-04 PROCEDURE — 29828 PR ARTHROSCOPY SHOULDER SURGICAL BICEPS TENODESIS: ICD-10-PCS | Mod: 51,LT,, | Performed by: ORTHOPAEDIC SURGERY

## 2022-01-04 PROCEDURE — 25000003 PHARM REV CODE 250: Performed by: NURSE ANESTHETIST, CERTIFIED REGISTERED

## 2022-01-04 PROCEDURE — 25000003 PHARM REV CODE 250: Performed by: PHYSICIAN ASSISTANT

## 2022-01-04 PROCEDURE — 94761 N-INVAS EAR/PLS OXIMETRY MLT: CPT

## 2022-01-04 PROCEDURE — 29827 PR SHLDR ARTHROSCOP,SURG,W/ROTAT CUFF REPR: ICD-10-PCS | Mod: LT,,, | Performed by: ORTHOPAEDIC SURGERY

## 2022-01-04 PROCEDURE — 63600175 PHARM REV CODE 636 W HCPCS: Performed by: ORTHOPAEDIC SURGERY

## 2022-01-04 PROCEDURE — 29826 PR SHLDR ARTHROSCOP,PART ACROMIOPLAS: ICD-10-PCS | Mod: LT,,, | Performed by: ORTHOPAEDIC SURGERY

## 2022-01-04 PROCEDURE — 29823 SHO ARTHRS SRG XTNSV DBRDMT: CPT | Mod: 51,LT,, | Performed by: ORTHOPAEDIC SURGERY

## 2022-01-04 PROCEDURE — 63600175 PHARM REV CODE 636 W HCPCS: Performed by: ANESTHESIOLOGY

## 2022-01-04 PROCEDURE — 27201423 OPTIME MED/SURG SUP & DEVICES STERILE SUPPLY: Performed by: ORTHOPAEDIC SURGERY

## 2022-01-04 PROCEDURE — C1713 ANCHOR/SCREW BN/BN,TIS/BN: HCPCS | Performed by: ORTHOPAEDIC SURGERY

## 2022-01-04 PROCEDURE — 71000015 HC POSTOP RECOV 1ST HR: Performed by: ORTHOPAEDIC SURGERY

## 2022-01-04 PROCEDURE — 29823 PR SHLDR ARTHROSCOP,EXTEN DEBRIDE: ICD-10-PCS | Mod: 51,LT,, | Performed by: ORTHOPAEDIC SURGERY

## 2022-01-04 PROCEDURE — 71000033 HC RECOVERY, INTIAL HOUR: Performed by: ORTHOPAEDIC SURGERY

## 2022-01-04 PROCEDURE — 71000039 HC RECOVERY, EACH ADD'L HOUR: Performed by: ORTHOPAEDIC SURGERY

## 2022-01-04 PROCEDURE — D9220A PRA ANESTHESIA: ICD-10-PCS | Mod: ANES,,, | Performed by: ANESTHESIOLOGY

## 2022-01-04 DEVICE — ANCHOR BIO-PUSHLOCK 3.5X19.5MM: Type: IMPLANTABLE DEVICE | Site: SHOULDER | Status: FUNCTIONAL

## 2022-01-04 DEVICE — PATCH ARTHROFLEX 1.5X20X25MM: Type: IMPLANTABLE DEVICE | Site: SHOULDER | Status: FUNCTIONAL

## 2022-01-04 DEVICE — IMPLANTABLE DEVICE: Type: IMPLANTABLE DEVICE | Site: SHOULDER | Status: FUNCTIONAL

## 2022-01-04 RX ORDER — KETOROLAC TROMETHAMINE 30 MG/ML
INJECTION, SOLUTION INTRAMUSCULAR; INTRAVENOUS
Status: DISCONTINUED | OUTPATIENT
Start: 2022-01-04 | End: 2022-01-04 | Stop reason: HOSPADM

## 2022-01-04 RX ORDER — CLINDAMYCIN PHOSPHATE 900 MG/50ML
900 INJECTION, SOLUTION INTRAVENOUS
Status: COMPLETED | OUTPATIENT
Start: 2022-01-04 | End: 2022-01-04

## 2022-01-04 RX ORDER — OXYCODONE HCL 10 MG/1
10 TABLET, FILM COATED, EXTENDED RELEASE ORAL
Status: COMPLETED | OUTPATIENT
Start: 2022-01-04 | End: 2022-01-04

## 2022-01-04 RX ORDER — MORPHINE SULFATE 2 MG/ML
2 INJECTION, SOLUTION INTRAMUSCULAR; INTRAVENOUS EVERY 10 MIN PRN
Status: DISCONTINUED | OUTPATIENT
Start: 2022-01-04 | End: 2022-01-04 | Stop reason: HOSPADM

## 2022-01-04 RX ORDER — NEOSTIGMINE METHYLSULFATE 1 MG/ML
INJECTION, SOLUTION INTRAVENOUS
Status: DISCONTINUED | OUTPATIENT
Start: 2022-01-04 | End: 2022-01-04

## 2022-01-04 RX ORDER — FAMOTIDINE 10 MG/ML
INJECTION INTRAVENOUS
Status: DISCONTINUED | OUTPATIENT
Start: 2022-01-04 | End: 2022-01-04

## 2022-01-04 RX ORDER — MIDAZOLAM HYDROCHLORIDE 1 MG/ML
INJECTION INTRAMUSCULAR; INTRAVENOUS
Status: DISCONTINUED | OUTPATIENT
Start: 2022-01-04 | End: 2022-01-04

## 2022-01-04 RX ORDER — SODIUM CHLORIDE 0.9 % (FLUSH) 0.9 %
10 SYRINGE (ML) INJECTION
Status: DISCONTINUED | OUTPATIENT
Start: 2022-01-04 | End: 2022-01-04 | Stop reason: HOSPADM

## 2022-01-04 RX ORDER — LIDOCAINE HYDROCHLORIDE 10 MG/ML
INJECTION, SOLUTION INTRAVENOUS
Status: DISCONTINUED | OUTPATIENT
Start: 2022-01-04 | End: 2022-01-04

## 2022-01-04 RX ORDER — KETAMINE HYDROCHLORIDE 100 MG/ML
INJECTION, SOLUTION INTRAMUSCULAR; INTRAVENOUS
Status: DISCONTINUED | OUTPATIENT
Start: 2022-01-04 | End: 2022-01-04 | Stop reason: HOSPADM

## 2022-01-04 RX ORDER — FENTANYL CITRATE 50 UG/ML
INJECTION, SOLUTION INTRAMUSCULAR; INTRAVENOUS
Status: DISCONTINUED | OUTPATIENT
Start: 2022-01-04 | End: 2022-01-04

## 2022-01-04 RX ORDER — ONDANSETRON 2 MG/ML
4 INJECTION INTRAMUSCULAR; INTRAVENOUS EVERY 12 HOURS PRN
Status: DISCONTINUED | OUTPATIENT
Start: 2022-01-04 | End: 2022-01-04 | Stop reason: HOSPADM

## 2022-01-04 RX ORDER — KETAMINE HCL IN 0.9 % NACL 50 MG/5 ML
SYRINGE (ML) INTRAVENOUS
Status: DISCONTINUED | OUTPATIENT
Start: 2022-01-04 | End: 2022-01-04

## 2022-01-04 RX ORDER — PROPOFOL 10 MG/ML
VIAL (ML) INTRAVENOUS
Status: DISCONTINUED | OUTPATIENT
Start: 2022-01-04 | End: 2022-01-04

## 2022-01-04 RX ORDER — PREGABALIN 75 MG/1
75 CAPSULE ORAL
Status: DISCONTINUED | OUTPATIENT
Start: 2022-01-04 | End: 2022-01-04 | Stop reason: HOSPADM

## 2022-01-04 RX ORDER — DEXAMETHASONE SODIUM PHOSPHATE 4 MG/ML
INJECTION, SOLUTION INTRA-ARTICULAR; INTRALESIONAL; INTRAMUSCULAR; INTRAVENOUS; SOFT TISSUE
Status: DISCONTINUED | OUTPATIENT
Start: 2022-01-04 | End: 2022-01-04

## 2022-01-04 RX ORDER — EPINEPHRINE 1 MG/ML
INJECTION, SOLUTION INTRACARDIAC; INTRAMUSCULAR; INTRAVENOUS; SUBCUTANEOUS
Status: DISCONTINUED | OUTPATIENT
Start: 2022-01-04 | End: 2022-01-04 | Stop reason: HOSPADM

## 2022-01-04 RX ORDER — OXYCODONE HYDROCHLORIDE 5 MG/1
10 TABLET ORAL EVERY 4 HOURS PRN
Status: DISCONTINUED | OUTPATIENT
Start: 2022-01-04 | End: 2022-01-04 | Stop reason: HOSPADM

## 2022-01-04 RX ORDER — ROCURONIUM BROMIDE 10 MG/ML
INJECTION, SOLUTION INTRAVENOUS
Status: DISCONTINUED | OUTPATIENT
Start: 2022-01-04 | End: 2022-01-04

## 2022-01-04 RX ORDER — DEXMEDETOMIDINE HYDROCHLORIDE 100 UG/ML
INJECTION, SOLUTION INTRAVENOUS
Status: DISCONTINUED | OUTPATIENT
Start: 2022-01-04 | End: 2022-01-04

## 2022-01-04 RX ORDER — HYDROMORPHONE HYDROCHLORIDE 1 MG/ML
0.2 INJECTION, SOLUTION INTRAMUSCULAR; INTRAVENOUS; SUBCUTANEOUS EVERY 5 MIN PRN
Status: DISCONTINUED | OUTPATIENT
Start: 2022-01-04 | End: 2022-01-04 | Stop reason: HOSPADM

## 2022-01-04 RX ORDER — SODIUM CHLORIDE 9 MG/ML
INJECTION, SOLUTION INTRAVENOUS CONTINUOUS
Status: DISCONTINUED | OUTPATIENT
Start: 2022-01-04 | End: 2022-01-04 | Stop reason: HOSPADM

## 2022-01-04 RX ORDER — PREGABALIN 75 MG/1
75 CAPSULE ORAL
Status: COMPLETED | OUTPATIENT
Start: 2022-01-04 | End: 2022-01-04

## 2022-01-04 RX ORDER — ONDANSETRON 2 MG/ML
INJECTION INTRAMUSCULAR; INTRAVENOUS
Status: DISCONTINUED | OUTPATIENT
Start: 2022-01-04 | End: 2022-01-04

## 2022-01-04 RX ORDER — TRAMADOL HYDROCHLORIDE 50 MG/1
100 TABLET ORAL ONCE
Status: COMPLETED | OUTPATIENT
Start: 2022-01-04 | End: 2022-01-04

## 2022-01-04 RX ORDER — CARBOXYMETHYLCELLULOSE SODIUM 5 MG/ML
SOLUTION/ DROPS OPHTHALMIC
Status: DISCONTINUED | OUTPATIENT
Start: 2022-01-04 | End: 2022-01-04

## 2022-01-04 RX ORDER — ROPIVACAINE HYDROCHLORIDE 5 MG/ML
INJECTION, SOLUTION EPIDURAL; INFILTRATION; PERINEURAL
Status: DISCONTINUED | OUTPATIENT
Start: 2022-01-04 | End: 2022-01-04 | Stop reason: HOSPADM

## 2022-01-04 RX ORDER — PROMETHAZINE HYDROCHLORIDE 25 MG/1
25 TABLET ORAL EVERY 6 HOURS PRN
Status: DISCONTINUED | OUTPATIENT
Start: 2022-01-04 | End: 2022-01-04 | Stop reason: HOSPADM

## 2022-01-04 RX ADMIN — CARBOXYMETHYLCELLULOSE SODIUM 2 DROP: 5 SOLUTION/ DROPS OPHTHALMIC at 07:01

## 2022-01-04 RX ADMIN — HYDROMORPHONE HYDROCHLORIDE 0.2 MG: 1 INJECTION, SOLUTION INTRAMUSCULAR; INTRAVENOUS; SUBCUTANEOUS at 10:01

## 2022-01-04 RX ADMIN — DEXAMETHASONE SODIUM PHOSPHATE 8 MG: 4 INJECTION, SOLUTION INTRAMUSCULAR; INTRAVENOUS at 07:01

## 2022-01-04 RX ADMIN — Medication 30 MG: at 07:01

## 2022-01-04 RX ADMIN — OXYCODONE HYDROCHLORIDE 10 MG: 10 TABLET, FILM COATED, EXTENDED RELEASE ORAL at 06:01

## 2022-01-04 RX ADMIN — PREGABALIN 75 MG: 75 CAPSULE ORAL at 06:01

## 2022-01-04 RX ADMIN — FAMOTIDINE 20 MG: 10 INJECTION, SOLUTION INTRAVENOUS at 07:01

## 2022-01-04 RX ADMIN — GLYCOPYRROLATE 0.4 MG: 0.2 INJECTION, SOLUTION INTRAMUSCULAR; INTRAVENOUS at 09:01

## 2022-01-04 RX ADMIN — NEOSTIGMINE METHYLSULFATE 4 MG: 1 INJECTION INTRAVENOUS at 09:01

## 2022-01-04 RX ADMIN — PROPOFOL 200 MG: 10 INJECTION, EMULSION INTRAVENOUS at 07:01

## 2022-01-04 RX ADMIN — ONDANSETRON 4 MG: 2 INJECTION, SOLUTION INTRAMUSCULAR; INTRAVENOUS at 07:01

## 2022-01-04 RX ADMIN — MIDAZOLAM HYDROCHLORIDE 2 MG: 1 INJECTION, SOLUTION INTRAMUSCULAR; INTRAVENOUS at 07:01

## 2022-01-04 RX ADMIN — ROCURONIUM BROMIDE 50 MG: 10 INJECTION, SOLUTION INTRAVENOUS at 07:01

## 2022-01-04 RX ADMIN — CLINDAMYCIN PHOSPHATE 900 MG: 18 INJECTION, SOLUTION INTRAVENOUS at 07:01

## 2022-01-04 RX ADMIN — FENTANYL CITRATE 100 MCG: 50 INJECTION, SOLUTION INTRAMUSCULAR; INTRAVENOUS at 07:01

## 2022-01-04 RX ADMIN — DEXMEDETOMIDINE HYDROCHLORIDE 25 MCG: 100 INJECTION, SOLUTION, CONCENTRATE INTRAVENOUS at 07:01

## 2022-01-04 RX ADMIN — TRAMADOL HYDROCHLORIDE 100 MG: 50 TABLET ORAL at 09:01

## 2022-01-04 RX ADMIN — SODIUM CHLORIDE, SODIUM GLUCONATE, SODIUM ACETATE, POTASSIUM CHLORIDE, MAGNESIUM CHLORIDE, SODIUM PHOSPHATE, DIBASIC, AND POTASSIUM PHOSPHATE: .53; .5; .37; .037; .03; .012; .00082 INJECTION, SOLUTION INTRAVENOUS at 07:01

## 2022-01-04 RX ADMIN — LIDOCAINE HYDROCHLORIDE 100 MG: 10 INJECTION, SOLUTION INTRAVENOUS at 07:01

## 2022-01-04 RX ADMIN — SODIUM CHLORIDE: 9 INJECTION, SOLUTION INTRAVENOUS at 07:01

## 2022-01-04 NOTE — PLAN OF CARE
VSS. Patient able to tolerate oral liquids. Patient reports tolerable pain level for discharge. Patient/family received home medication per bedside delivery. Dressing intact. Polar care intact, power adapter placed with patient belongings. Thigh TEDs intact. Patient instructed not to wear TESSIE hose without wearing closed-back shoes or  socks due to increased risk of falls, verbalized understanding. No distress noted. Patient states he is ready for discharge. Discharge instructions reviewed with patient and family, verbalized understanding. IV discontinued with catheter tip intact. Family at bedside to help patient dress. Patient wheeled to lobby via staff.

## 2022-01-04 NOTE — DISCHARGE INSTRUCTIONS
[employer] CARE CUBE COLD THERAPY SYSTEM    The Polar Care Cube Cold Therapy System is simple and reliable. It is easy to use, compact design makes it great for home use. With the addition of ice and water, you will enjoy 6-8 hours of effortless cold therapy. Proper use requires an insulation barrier between the pad and the patient's skin.  Instructions on how to use the Polar Care Cube Cold Therapy System Below:

## 2022-01-04 NOTE — DISCHARGE SUMMARY
[facility] - Surgery ([facility])  Brief Operative Note    Surgery Date: 1/4/2022     Surgeon(s) and Role:     * Marly Heart MD - Primary    Assisting Surgeon: Harshal Barrios MD PGY3    Pre-op Diagnosis:  Traumatic tear of left rotator cuff, unspecified tear extent, initial encounter [S46.012A]  Biceps tendinitis, unspecified laterality [M75.20]  Superior glenoid labrum lesion of left shoulder, initial encounter [S43.432A]  Osteoarthritis, unspecified osteoarthritis type, unspecified site [M19.90]  Closed displaced fracture of greater tuberosity of left humerus, initial encounter [S42.252A]    Post-op Diagnosis:  Post-Op Diagnosis Codes:     * Traumatic tear of left rotator cuff, unspecified tear extent, initial encounter [S46.012A]     * Biceps tendinitis, unspecified laterality [M75.20]     * Superior glenoid labrum lesion of left shoulder, initial encounter [S43.432A]     * Osteoarthritis, unspecified osteoarthritis type, unspecified site [M19.90]     * Closed displaced fracture of greater tuberosity of left humerus, initial encounter [S42.252A]    Procedure(s) (LRB):  REPAIR, ROTATOR CUFF, ARTHROSCOPIC (Left)  FIXATION, TENDON (Left)  DEBRIDEMENT, SHOULDER, ARTHROSCOPIC (Left)  MICROFRACTURE (Left)  ORIF, FRACTURE, HUMERUS, PROXIMAL (Left)    Anesthesia: General    Operative Findings: left shoulder arthroscopy    Estimated Blood Loss: minimal          Specimens:   Specimen (24h ago, onward)            None            Discharge Note    OUTCOME: Patient tolerated treatment/procedure well without complication and is now ready for discharge.    DISPOSITION: Home or Self Care    FINAL DIAGNOSIS:  Left shoulder rotator cuff tear and osteoarthritis     FOLLOWUP: In clinic    DISCHARGE INSTRUCTIONS:    Discharge Procedure Orders   Diet general     Call MD for:  temperature >100.4     Call MD for:  persistent nausea and vomiting     Call MD for:  severe uncontrolled pain     Call MD for:  difficulty breathing, headache or  visual disturbances     Call MD for:  redness, tenderness, or signs of infection (pain, swelling, redness, odor or green/yellow discharge around incision site)     Call MD for:  hives     Call MD for:  persistent dizziness or light-headedness     Ice to affected area   Order Comments: using barrier between ice and skin (specify duration&frequency)     No driving, operating heavy equipment or signing legal documents while taking pain medication     Lifting restrictions   Order Comments: Patient is to wear sling  for 6 weeks after surgery. No lifting greater than 1 pound weight with the surgical extremity.     Remove dressing in 72 hours     Shower on day dressing removed (No bath)

## 2022-01-04 NOTE — OP NOTE
DATE OF PROCEDURE: 01/04/2022    SURGEON: Marly Heart M.D.    ASSISTANT: CARMITA Barrios MD PGY3  ASSISTANT: SMA Julia      PREOPERATIVE DIAGNOSES:   left  1. Shoulder rotator cuff tear   2. Shoulder biceps tendonitis  3. Shoulder synovitis  4. Shoulder SLAP  5. Shoulder impingement, bursitis  6. Shoulder adhesions  7. Shoulder loose bodies    POSTOPERATIVE DIAGNOSES:   left  1. Shoulder rotator cuff tear   2. Shoulder biceps tendonitis  3. Shoulder synovitis  4. Shoulder SLAP  5. Shoulder impingement, bursitis  6. Shoulder adhesions  7. Shoulder loose bodies    OPERATION:   left  1. Shoulder arthroscopic rotator cuff repair (upper 1/8 subscapularis) with Cuff Mend ()  2. Shoulder arthroscopic biceps tenodesis (CPT 80057)  3. Shoulder arthroscopic subacromial decompression, bursectomy   4. Shoulder arthroscopic extensive debridement (anterior, posterior glenohumeral joint, subacromial space) (CPT 14728)  5. Shoulder arthroscopic labral debridement (CPT 71473)  6. Shoulder arthroscopic lysis of adhesions (CPT 03413)  7. Shoulder arthroscopic loose body removal    ANESTHESIA:  General with intra-op suprascapular nerve block with local    ESTIMATED BLOOD LOSS:  Minimal.    TOURNIQUET TIME:  None.    DRAINS:  None.    COMPLICATIONS:  None.  The patient was moved to the recovery room in stable condition with compartments soft and cap refill less than a second in all digits.    INDICATIONS/MEDICAL NECESSITY: The patient is a 46 y.o. year-old male who has history and physical examination findings consistent with the above. Preoperative studies revealed Tear, Rotator Cuff, traumatic S46.019A.  Patient was noted to have problems along the anterior and superior rotator cuff structures. The patient had clinical evidence of weakness and pain with overhead maneuvers. MRI was obtained revealing evidence of rotator cuff damage, which was consistent with the above stated preoperative diagnoses.   Nonoperative versus  operative options were discussed.  The risks and benefits were discussed with the patient.  The patient acknowledged understanding and wished to proceed with operative intervention.  Informed consent was obtained prior to the procedure.  Reasonable expectations and potential complications were discussed and acknowledged, including but not limited to infection, bleeding, blood clots, (DVT and/or PE), nerve injury, tear, instability, continued pain and stiffness.  They agreed and understood and wished to proceed.    EXAMINATION UNDER ANESTHESIA of the right SHOULDER: Forward elevation 175 degrees, External rotation at 0 60 degrees, External rotation at 90 90 degrees, Internal rotation at 90 60 degrees.  Translation testing: anterior grade 1, posterior grade 1, sulcus sign grade 1 corrects to 0 on external rotation.    EXAMINATION UNDER ANESTHESIA of the left SHOULDER: Forward elevation 175 degrees, External rotation at 0 60 degrees, External rotation at 90 90 degrees, Internal rotation at 90 60 degrees.  Translation testing: anterior grade 1, posterior grade 1, sulcus sign grade 1 corrects to 0 on external rotation.    PROCEDURE IN DETAIL:  After the correct operative site was marked by the operating surgeon. The patient was then taken to the operating room and placed supine on the operating room table, where the patient  underwent general anesthesia by the anesthesia team.  The patient was then rolled into the lateral decubitus position with the operative side up.  A well-padded axillary roll, beanbag and pillows were placed.  All pressure points were carefully padded and checked.  The upper extremities and both lower extremities were placed in comfortable positions and were also well-padded.  The operative upper extremity was then prepped and draped in the usual sterile fashion.    Suprascapular nerve block was performed in the standard fashion with 5cc local anesthetic.    The Spider arm positioner was implemented  with balanced suspension and appropriate landmarks were noted on the skin.  A posterior followed by christel-superior portals were created and systematic examination of the joint revealed the following:      There was no evidence of any significant chondral lesions to the glenoid or humeral head.     There was chondral damage to:  Humeral head: 20 x 15 mm grade 3  Glenoid: 5 x 15 mm grade 3  Chondroplasty was performed using arthroscopic shaver.    Tenosynovitis and SLAP type II was noted to the biceps tendon on ramp sign.    Diffuse labral fraying was debrided with shaver.    Partial thickness 20% cuff tearing on articular side was debrided with shaver and gently with thermal device.    Loose bodies measuring 5 x 5 x 7 mm was removed from glenohumeral joint compartment using arthroscopic grasper (subscap recess was clear after removal).    The surface of the rotator cuff (suscapularis upper 1/8 fibers) was then gently debrided and mobilized. The bony bed of the lesser tuberosity was prepared with the ernesto. This left a nice surface for the articular surface of the cuff to be repaired to and heal to. Adequate debridement was performed, moving the arm as needed with internal/external rotation.     Bird-beak was used to pass suture through upper 1/8 subscapularis tendon. The suture was passed though the cuff tissue in a lasso-loop technique. A 4.75 x 19.1 mm Arthrex Bio-corkscrew anchor was placed at the proximal aspect of the subscapularis insertion at the lesser tuberosity, repairing the subscapularis down nicely.    Above 4.75 x 19.1 mm Swivelock bio-composite anchor x 1 was used together with biceps loop stitch at the proximal aspect of the groove with loop technique through tendon. The proximal suture was passed though the biceps tendon in a lasso-loop technique, fastening the biceps tenodesis.    Attention was then turned to the rotator cuff.  The rotator cuff undersurface was then visualized and debrided as  needed using a shaver.     Attention was then turned to the subacromial space where a significant hypertrophic bursa was encountered.  The bursa itself was thickened and hypertrophic.  A lateral portal was created to assist with the bursectomy.  Subacromial decompression was completed using three-portal technique in the standard fashion with a 4.5 mm ernesto without difficulty.  The anterior osteophyte was flattened.  Confirmation of adequate resection was confirmed while viewing from the lateral portal.      Shoulder arthroscopic lysis of adhesions (CPT 17508):  With the arthroscope in the subacromial space, an extensive lysis of adhesions needed to be performed with lysis of adhesions in the lateral gutter, posterior gutter, and anterior gutter where there was extensive scarring from the chronic nature of the rotator cuff tear.  After the lysis of adhesions, the mobility was restored to the rotator cuff tissue and shoulder.    The surface of the rotator cuff was then gently debrided and mobilized.  We did this using a shaver while viewing through the posterior portal and the shaver used through the lateral portal.  We were then able to mobilize the cuff tear which was located at the supraspinatus extending to the infraspinatus.  Rotaor cuff was inspected the rotator cuff from the subacromial side.There was a clearly demonstrated rotator cuff tear with size and pattern as reported. The rotator cuff was mobilized on its superficial and deep surfaces to allow maximal placement over the anatomic footprint of the greater tuberosity. The rotator cuff was mobilized on its superficial and deep surfaces to allow maximal placement over the anatomic footprint of the greater tuberosity.     Passport cannula was placed into the widened lateral portal and the Arthrex Cuff Mend device was introduced and deployed to repair and augment rotator cuff tissue.  PDS staples were placed to stabilize the graft to the native tendon.  2 3.5  mm Arthrex PushLock cannulas were placed, anteriorly and posteriorly without excess tension securing the graft down laterally    Suprascapular nerve block was performed in the standard fashion with 5cc local anesthetic, and 5cc subacromially for local.  Local was placed about portals and incision(s) after irrigation, as described below, was completed. The shoulder and subacromial space were then irrigated and fluid was extravasated using suction. All portals were reapproximated using inverted 4-0 Monocryl suture in the subcutaneous tissue of the portals. Mastisol and Steri-strips were placed with xeroform, 4x4s, abd pad, and Medi-pore tape.  TENS unit pads were placed which were medically necessary for pain relief.  An iceman was secured in the shoulder good.  A sling with an abduction pillow was secured.  The patient was then moved to supine, extubated and taken to the recovery room where the patient arrived in stable condition with the compartments of the arm and forearm soft and cap refill less than a second in all digits.     POSTOPERATIVE PLAN: We will follow the arthroscopic rotator cuff repair guidelines for a small size rotator cuff tear.  We discussed with the patient's family after surgery.  The patient will remain in a sling for 6 weeks.  PT to start at 1-2 weeks.    Cuff specific program:  Pendulum exercises and Codman's exercises in 5-7 days, protecting rotator cuff repair for 1 week by avoiding active motion program until 1 week.     PASSIVE ROM: ER side 30 degrees, Forward Flex 90 degrees, ABD - 60 degrees   Full AAROM/PROM starting at 5-7 days as tolerated     Quality of tissue: good     Quality of the repair: good      Size of tear: 10 x 15 mm, 20% partial thickness

## 2022-01-04 NOTE — PLAN OF CARE
Notified Dr. Sandoval of difficult IV stick need for consent for pre-op med administration.  Nurse attempt x4 with no success.  MD to bedside for consents and  IV placement with ultrasound.

## 2022-01-04 NOTE — ANESTHESIA PREPROCEDURE EVALUATION
01/04/2022  Maxwell Echeverria is a 46 y.o., male.    Anesthesia Evaluation    I have reviewed the Patient Summary Reports.    I have reviewed the Nursing Notes. I have reviewed the NPO Status.   I have reviewed the Medications.     Review of Systems  Anesthesia Hx:  No previous Anesthesia  Neg history of prior surgery. Denies Family Hx of Anesthesia complications.   Denies Personal Hx of Anesthesia complications.   Social:  Non-Smoker, No Alcohol Use    Hematology/Oncology:  Hematology Normal   Oncology Normal     EENT/Dental:EENT/Dental Normal   Cardiovascular:  Cardiovascular Normal Exercise tolerance: good     Pulmonary:  Pulmonary Normal    Renal/:  Renal/ Normal     Hepatic/GI:  Hepatic/GI Normal    Musculoskeletal:  Musculoskeletal Normal    Neurological:  Neurology Normal    Endocrine:  Endocrine Normal    Dermatological:  Skin Normal    Psych:  Psychiatric Normal           Physical Exam  General:  Well nourished    Airway/Jaw/Neck:  Airway Findings: Mouth Opening: Normal Tongue: Normal  General Airway Assessment: Adult  TM Distance: Normal, at least 6 cm  Jaw/Neck Findings:  Micrognathia: Negative Neck ROM: Normal ROM      Dental:  Dental Findings: In tact   Chest/Lungs:  Chest/Lungs Findings: Clear to auscultation, Normal Respiratory Rate     Heart/Vascular:  Heart Findings: Rate: Normal  Rhythm: Regular Rhythm  Sounds: Normal  Heart murmur: negative    Abdomen:  Abdomen Findings:  Normal, Nontender, Soft       Mental Status:  Mental Status Findings:  Cooperative, Alert and Oriented         Anesthesia Plan  Type of Anesthesia, risks & benefits discussed:  Anesthesia Type:  general    Patient's Preference:   Plan Factors:          Intra-op Monitoring Plan: standard ASA monitors  Intra-op Monitoring Plan Comments:   Post Op Pain Control Plan: IV/PO Opioids PRN and multimodal analgesia  Post Op Pain Control  Plan Comments:     Induction:   IV  Beta Blocker:  Patient is not currently on a Beta-Blocker (No further documentation required).       Informed Consent: Patient understands risks and agrees with Anesthesia plan.  Questions answered. Anesthesia consent signed with patient.  ASA Score: 1     Day of Surgery Review of History & Physical:    H&P update referred to the surgeon.         Ready For Surgery From Anesthesia Perspective.

## 2022-01-04 NOTE — PLAN OF CARE
Patient prepped for procedure.  POC communicated with pt and spouse, who verbalized understanding.  Patient's wife Ms. Ward to remain on campus during procedure.  She has dark hair and is wearing white fleece and leggings. All belongings to remain with her during procedure.      18g IV placed by Dr. JESSIE Sandoval MD with U/S guidance.      Checklist transferred from red folder to chart.

## 2022-01-04 NOTE — ANESTHESIA POSTPROCEDURE EVALUATION
Anesthesia Post Evaluation    Patient: Maxwell Beach    Procedure(s) Performed: Procedure(s) (LRB):  REPAIR, ROTATOR CUFF, ARTHROSCOPIC (Left)  LABRUM DEBRIDEMENT, SHOULDER, ARTHROSCOPIC (Left)  REMOVAL, LOOSE BODY, JOINT, ARTHROSCOPIC (Left)  ARTHROSCOPY, SHOULDER, WITH SUBACROMIAL SPACE DECOMPRESSION (Left)  BICEPS TENODESIS (Left)    Final Anesthesia Type: general      Patient location during evaluation: PACU  Patient participation: Yes- Able to Participate  Level of consciousness: awake and alert  Post-procedure vital signs: reviewed and stable  Pain management: adequate  Airway patency: patent    PONV status at discharge: No PONV  Anesthetic complications: no      Cardiovascular status: blood pressure returned to baseline  Respiratory status: unassisted and spontaneous ventilation  Hydration status: euvolemic  Follow-up not needed.          Vitals Value Taken Time   /92 01/04/22 1016   Temp 36.6 °C (97.8 °F) 01/04/22 1015   Pulse 67 01/04/22 1019   Resp 16 01/04/22 1018   SpO2 95 % 01/04/22 1019   Vitals shown include unvalidated device data.      Event Time   Out of Recovery 10:25:00         Pain/Manjit Score: Pain Rating Prior to Med Admin: 7 (1/4/2022 10:18 AM)  Pain Rating Post Med Admin: 7 (1/4/2022 10:18 AM)  Manjit Score: 10 (1/4/2022 10:15 AM)

## 2022-01-04 NOTE — TRANSFER OF CARE
"Anesthesia Transfer of Care Note    Patient: Maxwell Echeverria    Procedure(s) Performed: Procedure(s) (LRB):  REPAIR, ROTATOR CUFF, ARTHROSCOPIC (Left)  FIXATION, TENDON (Left)  DEBRIDEMENT, SHOULDER, ARTHROSCOPIC (Left)  MICROFRACTURE (Left)  ORIF, FRACTURE, HUMERUS, PROXIMAL (Left)    Patient location: PACU    Anesthesia Type: general    Transport from OR: Transported from OR on room air with adequate spontaneous ventilation. Transported from OR on 6-10 L/min O2 by face mask with adequate spontaneous ventilation    Post pain: adequate analgesia    Post assessment: no apparent anesthetic complications and tolerated procedure well    Post vital signs: stable    Level of consciousness: sedated    Nausea/Vomiting: no nausea/vomiting    Complications: none    Transfer of care protocol was followed      Last vitals:   Visit Vitals  BP (!) 152/99 (BP Location: Right arm, Patient Position: Lying)   Pulse 75   Temp 36.9 °C (98.5 °F) (Oral)   Resp 18   Ht 6' 1" (1.854 m)   Wt 106.1 kg (234 lb)   SpO2 100%   BMI 30.87 kg/m²     "

## 2022-01-04 NOTE — ANESTHESIA PROCEDURE NOTES
Intubation    Date/Time: 1/4/2022 7:10 AM  Performed by: Doreen Segovia CRNA  Authorized by: Zacarias Sandoval MD     Intubation:     Induction:  Intravenous    Intubated:  Postinduction    Mask Ventilation:  Easy with oral airway    Attempts:  1    Attempted By:  CRNA    Method of Intubation:  Direct    Blade:  Negro 2    Laryngeal View Grade: Grade I - full view of cords      Difficult Airway Encountered?: No      Complications:  None    Airway Device:  Oral endotracheal tube    Airway Device Size:  7.5    Style/Cuff Inflation:  Cuffed    Inflation Amount (mL):  6    Tube secured:  23    Secured at:  The lips    Placement Verified By:  Capnometry and Colorimetric ETCO2 device    Complicating Factors:  None and oropharyngeal edema or fat    Findings Post-Intubation:  BS equal bilateral

## 2022-01-12 ENCOUNTER — CLINICAL SUPPORT (OUTPATIENT)
Dept: REHABILITATION | Facility: HOSPITAL | Age: 47
End: 2022-01-12
Payer: COMMERCIAL

## 2022-01-12 DIAGNOSIS — S46.012A TRAUMATIC TEAR OF LEFT ROTATOR CUFF, UNSPECIFIED TEAR EXTENT, INITIAL ENCOUNTER: ICD-10-CM

## 2022-01-12 DIAGNOSIS — M75.20 BICEPS TENDINITIS, UNSPECIFIED LATERALITY: ICD-10-CM

## 2022-01-12 PROCEDURE — 97161 PT EVAL LOW COMPLEX 20 MIN: CPT

## 2022-01-12 PROCEDURE — 97110 THERAPEUTIC EXERCISES: CPT
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
## 2022-01-18 ENCOUNTER — CLINICAL SUPPORT (OUTPATIENT)
Dept: REHABILITATION | Facility: HOSPITAL | Age: 47
End: 2022-01-18
Payer: COMMERCIAL

## 2022-01-18 DIAGNOSIS — M25.512 ACUTE PAIN OF LEFT SHOULDER: ICD-10-CM

## 2022-01-18 PROCEDURE — 97110 THERAPEUTIC EXERCISES: CPT

## 2022-01-18 PROCEDURE — 97140 MANUAL THERAPY 1/> REGIONS: CPT

## 2022-01-18 NOTE — PROGRESS NOTES
Physical Therapy Daily Treatment Note     Name: [facility] Number: [id number]    Therapy Diagnosis:   Encounter Diagnosis   Name Primary?    Acute pain of left shoulder      Physician: Karson Thakkar III, *    Visit Date: 2022    Physician Orders: PT Eval and Treat   Medical Diagnosis:   M75.20 (ICD-10-CM) - Biceps tendinitis, unspecified laterality   S46.012A (ICD-10-CM) - Traumatic tear of left rotator cuff, unspecified tear extent, initial encounter         Date of Surgery: 22  Evaluation Date: 2022  Authorization Period Expiration: 1/3/23  Plan of Care Certification Period: 22  Visit # / Visits authorized:   DOS: 22     Time In: 200 pm  Time Out: 255 pm  Total Billable Time: 35 minutes     Precautions: Standard, s/p L RCR-small with biceps tenodesis  POSTOPERATIVE PLAN: We will follow the arthroscopic rotator cuff repair guidelines for a small size rotator cuff tear.  We discussed with the patient's family after surgery.  The patient will remain in a sling for 6 weeks.  PT to start at 1-2 weeks.     Cuff specific program:  Pendulum exercises and Codman's exercises in 5-7 days, protecting rotator cuff repair for 1 week by avoiding active motion program until 1 week.     PASSIVE ROM: ER side 30 degrees, Forward Flex 90 degrees, ABD - 60 degrees   Full AAROM/PROM starting at 5-7 days as tolerated        Subjective      Pt reports:he feels pain anteriorly with scapular retractions.    he was compliant with home exercise program given last session.   Response to previous treatment:NA  Functional change: NA    Pain: 0/10  Location: left shoulder      Objective     Maxwell received therapeutic exercises to develop strength, endurance, ROM and posture for 25 minutes includin way wrist 1# x30 ea  Scapular retraction 3x 1 min  Pendulums x2 min (fwd/back and side to side)   Leaning over EOT scapular retraction 2x10 with 5 sec hold  Elbow flexion x30  Elbow ext  stretch x2 min  PROM per protocol x 8 min  PT education and HEP review    Maxwell received the following manual therapy techniques: Joint mobilizations and Manual traction were applied to the: L shoulder for 10 minutes, including:  PROM per protocol with AP and inferior grade I/II mobilization and light distraction    Maxwell received hot pack for 10 minutes to increase circulation and promote tissue healing.    Maxwell received ice pack for 10 min to decrease pain and swelling.         Home Exercises Provided and Patient Education Provided     Education provided:   - HEP to Go    Written Home Exercises Provided: Patient instructed to cont prior HEP.  Exercises were reviewed and Maxwell was able to demonstrate them prior to the end of the session.  Maxwell demonstrated good  understanding of the education provided.     See EMR under Patient Instructions for exercises provided     Assessment     Pt was able to progress wrist and scapular interventions well this session. He showed good scapular activation leaning over EOT and demonstrated good compliance with HEP.   Maxwell Is progressing well towards his goals.   Pt prognosis is Good.     Pt will continue to benefit from skilled outpatient physical therapy to address the deficits listed in the problem list box on initial evaluation, provide pt/family education and to maximize pt's level of independence in the home and community environment.     Pt's spiritual, cultural and educational needs considered and pt agreeable to plan of care and goals.    Anticipated barriers to physical therapy: none    Goals:   Short Term Goals: (4 weeks)   - Pt will increase ROM to 100 deg L shoulder flex and abd (Progressing, not met)  - Decrease Pain to 4/10 as worst with all PT interventions (Progressing, not met)  - Pt to self correct posture with minimal cues (Progressing, not met)  - Pt independent with HEP with progressions. (Progressing, not met)     Long Term Goals (24 Weeks):  - Pt will increase ROM to  180 deg L shoulder flex and abd, 90 deg ER, 80 deg IR (Progressing, not met)  - Pt will increase strength to 5/5 LUE in all planes (Progressing, not met)  - Decrease Pain to 0/10 with lifting 35# for work specific tasks (Progressing, not met)  - Pt to return to 80% PLOF (Progressing, not met)    Plan     Continue POC per pt tolerance progressing shoulder ROM and strength per protocol.    Monika Fuchs, PT

## 2022-01-19 ENCOUNTER — OFFICE VISIT (OUTPATIENT)
Dept: SPORTS MEDICINE | Facility: CLINIC | Age: 47
End: 2022-01-19
Payer: COMMERCIAL

## 2022-01-19 VITALS
SYSTOLIC BLOOD PRESSURE: 128 MMHG | WEIGHT: 234 LBS | BODY MASS INDEX: 31.01 KG/M2 | HEART RATE: 73 BPM | HEIGHT: 73 IN | DIASTOLIC BLOOD PRESSURE: 91 MMHG

## 2022-01-19 DIAGNOSIS — Z98.890 S/P ROTATOR CUFF SURGERY: Primary | ICD-10-CM

## 2022-01-19 PROCEDURE — 1159F MED LIST DOCD IN RCRD: CPT | Mod: CPTII,S$GLB,, | Performed by: PHYSICIAN ASSISTANT

## 2022-01-19 PROCEDURE — 1160F PR REVIEW ALL MEDS BY PRESCRIBER/CLIN PHARMACIST DOCUMENTED: ICD-10-PCS | Mod: CPTII,S$GLB,, | Performed by: PHYSICIAN ASSISTANT

## 2022-01-19 PROCEDURE — 99024 PR POST-OP FOLLOW-UP VISIT: ICD-10-PCS | Mod: S$GLB,,, | Performed by: PHYSICIAN ASSISTANT

## 2022-01-19 PROCEDURE — 1159F PR MEDICATION LIST DOCUMENTED IN MEDICAL RECORD: ICD-10-PCS | Mod: CPTII,S$GLB,, | Performed by: PHYSICIAN ASSISTANT

## 2022-01-19 PROCEDURE — 99999 PR PBB SHADOW E&M-EST. PATIENT-LVL III: ICD-10-PCS | Mod: PBBFAC,,, | Performed by: PHYSICIAN ASSISTANT

## 2022-01-19 PROCEDURE — 3008F PR BODY MASS INDEX (BMI) DOCUMENTED: ICD-10-PCS | Mod: CPTII,S$GLB,, | Performed by: PHYSICIAN ASSISTANT

## 2022-01-19 PROCEDURE — 3074F PR MOST RECENT SYSTOLIC BLOOD PRESSURE < 130 MM HG: ICD-10-PCS | Mod: CPTII,S$GLB,, | Performed by: PHYSICIAN ASSISTANT

## 2022-01-19 PROCEDURE — 3080F PR MOST RECENT DIASTOLIC BLOOD PRESSURE >= 90 MM HG: ICD-10-PCS | Mod: CPTII,S$GLB,, | Performed by: PHYSICIAN ASSISTANT

## 2022-01-19 PROCEDURE — 3074F SYST BP LT 130 MM HG: CPT | Mod: CPTII,S$GLB,, | Performed by: PHYSICIAN ASSISTANT

## 2022-01-19 PROCEDURE — 99999 PR PBB SHADOW E&M-EST. PATIENT-LVL III: CPT | Mod: PBBFAC,,, | Performed by: PHYSICIAN ASSISTANT

## 2022-01-19 PROCEDURE — 3080F DIAST BP >= 90 MM HG: CPT | Mod: CPTII,S$GLB,, | Performed by: PHYSICIAN ASSISTANT

## 2022-01-19 PROCEDURE — 3008F BODY MASS INDEX DOCD: CPT | Mod: CPTII,S$GLB,, | Performed by: PHYSICIAN ASSISTANT

## 2022-01-19 PROCEDURE — 1160F RVW MEDS BY RX/DR IN RCRD: CPT | Mod: CPTII,S$GLB,, | Performed by: PHYSICIAN ASSISTANT

## 2022-01-19 PROCEDURE — 99024 POSTOP FOLLOW-UP VISIT: CPT | Mod: S$GLB,,, | Performed by: PHYSICIAN ASSISTANT

## 2022-01-20 NOTE — PROGRESS NOTES
Chief Complaint: left shoulder pain    HISTORY OF PRESENT ILLNESS:   Pt is here today for post-operative followup of shoulder arthroscopy.  he is doing well.  We have reviewed patient's findings and discussed plan of care and future treatment options.      Tolerating pain well.   Wearing sling which is in place.  No issues reported.  Has started PT with Monika.   Previous competitive swimmer.   He is no longer taking pain medications.     DATE OF PROCEDURE: 01/04/2022  SURGEON: Marly Heart M.D.  OPERATION:   left  1. Shoulder arthroscopic rotator cuff repair (upper 1/8 subscapularis) with Cuff Mend ()  2. Shoulder arthroscopic biceps tenodesis (CPT 17730)  3. Shoulder arthroscopic subacromial decompression, bursectomy   4. Shoulder arthroscopic extensive debridement (anterior, posterior glenohumeral joint, subacromial space) (CPT 00969)  5. Shoulder arthroscopic labral debridement (CPT 97498)  6. Shoulder arthroscopic lysis of adhesions (CPT 91871)  7. Shoulder arthroscopic loose body removal    There was chondral damage to:  Humeral head: 20 x 15 mm grade 3  Glenoid: 5 x 15 mm grade 3  Chondroplasty was performed using arthroscopic shaver.                                                                               PHYSICAL EXAMINATION:     Incision sites healed well  No evidence of any erythema, infection or induration  elbow Range of motion full   Minimal effusion  2+ Radial pulses  No swelling                                                                               ASSESSMENT:                                                                                                                                               1. Status post above, doing well.                                                                                                                               PLAN:                                                                                                                                                      1. PT; wean narcotics. Sling for 6 weeks after surgery.  2. Emphasized scapular function.  3. I have discussed return to activity in detail.  4. Patient will see us back in 4 weeks.                                      5. All questions were answered and the patient should contact us if he  has any questions or concerns in the interim.

## 2022-01-25 ENCOUNTER — CLINICAL SUPPORT (OUTPATIENT)
Dept: REHABILITATION | Facility: HOSPITAL | Age: 47
End: 2022-01-25
Payer: COMMERCIAL

## 2022-01-25 DIAGNOSIS — M25.512 ACUTE PAIN OF LEFT SHOULDER: ICD-10-CM

## 2022-01-25 PROCEDURE — 97140 MANUAL THERAPY 1/> REGIONS: CPT

## 2022-01-25 PROCEDURE — 97110 THERAPEUTIC EXERCISES: CPT

## 2022-01-25 NOTE — PROGRESS NOTES
Physical Therapy Daily Treatment Note     Name: [facility] Number: [id number]    Therapy Diagnosis:   Encounter Diagnosis   Name Primary?    Acute pain of left shoulder      Physician: Karson Thakkar III, *    Visit Date: 2022    Physician Orders: PT Eval and Treat   Medical Diagnosis:   M75.20 (ICD-10-CM) - Biceps tendinitis, unspecified laterality   S46.012A (ICD-10-CM) - Traumatic tear of left rotator cuff, unspecified tear extent, initial encounter         Date of Surgery: 22  Evaluation Date: 2022  Authorization Period Expiration: 1/3/23  Plan of Care Certification Period: 22  Visit # / Visits authorized:   DOS: 22     Time In: 200 pm  Time Out: 255 pm   Total Billable Time: 35 minutes     Precautions: Standard, s/p L RCR-small with biceps tenodesis  POSTOPERATIVE PLAN: We will follow the arthroscopic rotator cuff repair guidelines for a small size rotator cuff tear.  We discussed with the patient's family after surgery.  The patient will remain in a sling for 6 weeks.  PT to start at 1-2 weeks.     Cuff specific program:  Pendulum exercises and Codman's exercises in 5-7 days, protecting rotator cuff repair for 1 week by avoiding active motion program until 1 week.     PASSIVE ROM: ER side 30 degrees, Forward Flex 90 degrees, ABD - 60 degrees   Full AAROM/PROM starting at 5-7 days as tolerated        Subjective      Pt reports:while doing pendulums in the shower, his shoulder felt more unstable than normal, but it only happened once.    he was compliant with home exercise program given last session.   Response to previous treatment:NA  Functional change: NA    Pain: 0/10  Location: left shoulder      Objective     Maxwell received therapeutic exercises to develop strength, endurance, ROM and posture for 25 minutes includin way wrist 1# x30 ea  Scapular retraction 3x 1 min-np  Pendulums x2 min (fwd/back and side to side)   Leaning over EOT scapular  retraction 3x10 with 5 sec hold  Elbow flexion x30 1# with arm supported  Elbow ext stretch x2 min 1#  Standing leaning over EOT IR/neutral with towel x3 min  PROM per protocol x 8 min  PT education and HEP review    Maxwell received the following manual therapy techniques: Joint mobilizations and Manual traction were applied to the: L shoulder for 10 minutes, including:  PROM per protocol with AP and inferior grade I/II mobilization and light distraction    Maxwell received hot pack for 10 minutes to increase circulation and promote tissue healing.    Maxwell received ice pack for 10 min to decrease pain and swelling.         Home Exercises Provided and Patient Education Provided     Education provided:   - HEP to Go    Written Home Exercises Provided: Patient instructed to cont prior HEP.  Exercises were reviewed and Maxwell was able to demonstrate them prior to the end of the session.  Maxwell demonstrated good  understanding of the education provided.     See EMR under Patient Instructions for exercises provided     Assessment     Pt tolerated supported elbow flexion with low weight well today and is progress well toward desired PROM for this phase of rehab. Plan to slightly progress next session pending continued good response to treatment.   Maxwell Is progressing well towards his goals.   Pt prognosis is Good.     Pt will continue to benefit from skilled outpatient physical therapy to address the deficits listed in the problem list box on initial evaluation, provide pt/family education and to maximize pt's level of independence in the home and community environment.     Pt's spiritual, cultural and educational needs considered and pt agreeable to plan of care and goals.    Anticipated barriers to physical therapy: none    Goals:   Short Term Goals: (4 weeks)   - Pt will increase ROM to 100 deg L shoulder flex and abd (Progressing, not met)  - Decrease Pain to 4/10 as worst with all PT interventions (Progressing, not met)  - Pt to  self correct posture with minimal cues (Progressing, not met)  - Pt independent with HEP with progressions. (Progressing, not met)     Long Term Goals (24 Weeks):  - Pt will increase ROM to 180 deg L shoulder flex and abd, 90 deg ER, 80 deg IR (Progressing, not met)  - Pt will increase strength to 5/5 LUE in all planes (Progressing, not met)  - Decrease Pain to 0/10 with lifting 35# for work specific tasks (Progressing, not met)  - Pt to return to 80% PLOF (Progressing, not met)    Plan     Continue POC per pt tolerance progressing shoulder ROM and strength per protocol.    Monika Fuchs, PT

## 2022-01-26 ENCOUNTER — PATIENT MESSAGE (OUTPATIENT)
Dept: ADMINISTRATIVE | Facility: HOSPITAL | Age: 47
End: 2022-01-26
Payer: COMMERCIAL

## 2022-02-01 ENCOUNTER — CLINICAL SUPPORT (OUTPATIENT)
Dept: REHABILITATION | Facility: HOSPITAL | Age: 47
End: 2022-02-01
Payer: COMMERCIAL

## 2022-02-01 DIAGNOSIS — M25.512 ACUTE PAIN OF LEFT SHOULDER: ICD-10-CM

## 2022-02-01 PROCEDURE — 97140 MANUAL THERAPY 1/> REGIONS: CPT

## 2022-02-01 PROCEDURE — 97110 THERAPEUTIC EXERCISES: CPT

## 2022-02-01 NOTE — PROGRESS NOTES
Physical Therapy Daily Treatment Note     Name: [facility] Number: [id number]    Therapy Diagnosis:   Encounter Diagnosis   Name Primary?    Acute pain of left shoulder      Physician: Karson Thakkar III, *    Visit Date: 2022    Physician Orders: PT Eval and Treat   Medical Diagnosis:   M75.20 (ICD-10-CM) - Biceps tendinitis, unspecified laterality   S46.012A (ICD-10-CM) - Traumatic tear of left rotator cuff, unspecified tear extent, initial encounter         Date of Surgery: 22  Evaluation Date: 2022  Authorization Period Expiration: 1/3/23  Plan of Care Certification Period: 22  Visit # / Visits authorized:   DOS: 22     Time In: 200 pm  Time Out: 300 pm   Total Billable Time: 60 minutes     Precautions: Standard, s/p L RCR-small with biceps tenodesis  POSTOPERATIVE PLAN: We will follow the arthroscopic rotator cuff repair guidelines for a small size rotator cuff tear.  We discussed with the patient's family after surgery.  The patient will remain in a sling for 6 weeks.  PT to start at 1-2 weeks.     Cuff specific program:  Pendulum exercises and Codman's exercises in 5-7 days, protecting rotator cuff repair for 1 week by avoiding active motion program until 1 week.     PASSIVE ROM: ER side 30 degrees, Forward Flex 90 degrees, ABD - 60 degrees   Full AAROM/PROM starting at 5-7 days as tolerated        Subjective      Pt reports:he is feeling good in shoulder with no new issues to report.    he was compliant with home exercise program given last session.   Response to previous treatment:NA  Functional change: NA    Pain: 0/10  Location: left shoulder      Objective     Maxwell received therapeutic exercises to develop strength, endurance, ROM and posture for 30 minutes includin way wrist 2# x30 ea  Pendulums x2 min (fwd/back and side to side) 2#  Leaning over EOT scapular retraction 3x10 with 5 sec hold 2#  Supine dowel flexion and ER 2x10 ea  Elbow  flexion x30 1# with arm supported-np  Elbow ext stretch x3 min 3#  Standing leaning over EOT IR/neutral with towel x3 min-np  PT education and HEP review    Maxwell received the following manual therapy techniques: Joint mobilizations and Manual traction were applied to the: L shoulder for 10 minutes, including:  PROM per protocol with AP and inferior grade I/II mobilization and light distraction    Maxwell received hot pack for 10 minutes to increase circulation and promote tissue healing.    Maxwell received ice pack for 10 min to decrease pain and swelling.         Home Exercises Provided and Patient Education Provided     Education provided:   - HEP to Go    Written Home Exercises Provided: Patient instructed to cont prior HEP.  Exercises were reviewed and Maxwell was able to demonstrate them prior to the end of the session.  Maxwell demonstrated good  understanding of the education provided.     See EMR under Patient Instructions for exercises provided     Assessment     Pt tolerated initial AAROM interventions supine well. Pt had some guarding at end ranges this session, so plan to continue progressing ROM.   Maxwell Is progressing well towards his goals.   Pt prognosis is Good.     Pt will continue to benefit from skilled outpatient physical therapy to address the deficits listed in the problem list box on initial evaluation, provide pt/family education and to maximize pt's level of independence in the home and community environment.     Pt's spiritual, cultural and educational needs considered and pt agreeable to plan of care and goals.    Anticipated barriers to physical therapy: none    Goals:   Short Term Goals: (4 weeks)   - Pt will increase ROM to 100 deg L shoulder flex and abd (Progressing, not met)  - Decrease Pain to 4/10 as worst with all PT interventions (Progressing, not met)  - Pt to self correct posture with minimal cues (Progressing, not met)  - Pt independent with HEP with progressions. (Progressing, not  met)     Long Term Goals (24 Weeks):  - Pt will increase ROM to 180 deg L shoulder flex and abd, 90 deg ER, 80 deg IR (Progressing, not met)  - Pt will increase strength to 5/5 LUE in all planes (Progressing, not met)  - Decrease Pain to 0/10 with lifting 35# for work specific tasks (Progressing, not met)  - Pt to return to 80% PLOF (Progressing, not met)    Plan     Continue POC per pt tolerance progressing shoulder ROM and strength per protocol.    Monika Fuchs, PT

## 2022-02-08 ENCOUNTER — CLINICAL SUPPORT (OUTPATIENT)
Dept: REHABILITATION | Facility: HOSPITAL | Age: 47
End: 2022-02-08
Payer: COMMERCIAL

## 2022-02-08 DIAGNOSIS — M25.512 ACUTE PAIN OF LEFT SHOULDER: ICD-10-CM

## 2022-02-08 PROCEDURE — 97140 MANUAL THERAPY 1/> REGIONS: CPT

## 2022-02-08 PROCEDURE — 97110 THERAPEUTIC EXERCISES: CPT

## 2022-02-08 NOTE — PROGRESS NOTES
Physical Therapy Daily Treatment Note     Name: [facility] Number: [id number]    Therapy Diagnosis:   Encounter Diagnosis   Name Primary?    Acute pain of left shoulder      Physician: Karson Thakkar III, *    Visit Date: 2/8/2022    Physician Orders: PT Eval and Treat   Medical Diagnosis:   M75.20 (ICD-10-CM) - Biceps tendinitis, unspecified laterality   S46.012A (ICD-10-CM) - Traumatic tear of left rotator cuff, unspecified tear extent, initial encounter         Date of Surgery: 1/4/22  Evaluation Date: 1/12/2022  Authorization Period Expiration: 1/3/23  Plan of Care Certification Period: 6/29/22  Visit # / Visits authorized: 4/ 20  DOS: 1/4/22     Time In: 200 pm  Time Out: 255 pm   Total Billable Time: 55 minutes     Precautions: Standard, s/p L RCR-small with biceps tenodesis  POSTOPERATIVE PLAN: We will follow the arthroscopic rotator cuff repair guidelines for a small size rotator cuff tear.  We discussed with the patient's family after surgery.  The patient will remain in a sling for 6 weeks.  PT to start at 1-2 weeks.     Cuff specific program:  Pendulum exercises and Codman's exercises in 5-7 days, protecting rotator cuff repair for 1 week by avoiding active motion program until 1 week.     PASSIVE ROM: ER side 30 degrees, Forward Flex 90 degrees, ABD - 60 degrees   Full AAROM/PROM starting at 5-7 days as tolerated        Subjective      Pt reports:he is feeling good and ready to d/c sling next week.    he was compliant with home exercise program given last session.   Response to previous treatment:NA  Functional change: NA    Pain: 0/10  Location: left shoulder      Objective     Maxwell received therapeutic exercises to develop strength, endurance, ROM and posture for 35 minutes including:  UBE x4 min fwd (no resistance)   Pendulums x2 min (fwd/back and side to side) 2#  Leaning over EOT scapular retraction 3x10 with 5 sec hold 2#  Supine dowel flexion and ER 2x10  ea  Scapular rows otb 2x15 with 5 sec hold  Shoulder ext otb 2x10 with 5 sec hold  Elbow flexion x30 1# with arm supported-np  Elbow ext stretch x3 min 3#-np  Standing leaning over EOT IR/neutral with towel x3 min-np  PT education and HEP review    Maxwell received the following manual therapy techniques: Joint mobilizations and Manual traction were applied to the: L shoulder for 10 minutes, including:  PROM per protocol with AP and inferior grade I/II mobilization and light distraction      Maxwell received ice pack for 10 min to decrease pain and swelling.         Home Exercises Provided and Patient Education Provided     Education provided:   - HEP to Go    Written Home Exercises Provided: Patient instructed to cont prior HEP.  Exercises were reviewed and Maxwell was able to demonstrate them prior to the end of the session.  Maxwell demonstrated good  understanding of the education provided.     See EMR under Patient Instructions for exercises provided     Assessment     Pt demonstrated improved PROM this session with less guarding noted. He tolerated light initiation into AAROM well and continues to progress well toward functional goals.   Maxwell Is progressing well towards his goals.   Pt prognosis is Good.     Pt will continue to benefit from skilled outpatient physical therapy to address the deficits listed in the problem list box on initial evaluation, provide pt/family education and to maximize pt's level of independence in the home and community environment.     Pt's spiritual, cultural and educational needs considered and pt agreeable to plan of care and goals.    Anticipated barriers to physical therapy: none    Goals:   Short Term Goals: (4 weeks)   - Pt will increase ROM to 100 deg L shoulder flex and abd (Progressing, not met)  - Decrease Pain to 4/10 as worst with all PT interventions (Progressing, not met)  - Pt to self correct posture with minimal cues (Progressing, not met)  - Pt independent with HEP with  progressions. (Progressing, not met)     Long Term Goals (24 Weeks):  - Pt will increase ROM to 180 deg L shoulder flex and abd, 90 deg ER, 80 deg IR (Progressing, not met)  - Pt will increase strength to 5/5 LUE in all planes (Progressing, not met)  - Decrease Pain to 0/10 with lifting 35# for work specific tasks (Progressing, not met)  - Pt to return to 80% PLOF (Progressing, not met)    Plan     Continue POC per pt tolerance progressing shoulder ROM and strength per protocol.    Monika Fuchs, PT

## 2022-02-15 ENCOUNTER — CLINICAL SUPPORT (OUTPATIENT)
Dept: REHABILITATION | Facility: HOSPITAL | Age: 47
End: 2022-02-15
Payer: COMMERCIAL

## 2022-02-15 DIAGNOSIS — M25.512 ACUTE PAIN OF LEFT SHOULDER: ICD-10-CM

## 2022-02-15 PROCEDURE — 97110 THERAPEUTIC EXERCISES: CPT

## 2022-02-15 PROCEDURE — 97112 NEUROMUSCULAR REEDUCATION: CPT

## 2022-02-15 NOTE — PROGRESS NOTES
Physical Therapy Daily Treatment Note     Name: [facility] Number: [id number]    Therapy Diagnosis:   Encounter Diagnosis   Name Primary?    Acute pain of left shoulder      Physician: Karson Thakkar III, *    Visit Date: 2/15/2022    Physician Orders: PT Eval and Treat   Medical Diagnosis:   M75.20 (ICD-10-CM) - Biceps tendinitis, unspecified laterality   S46.012A (ICD-10-CM) - Traumatic tear of left rotator cuff, unspecified tear extent, initial encounter         Date of Surgery: 1/4/22  Evaluation Date: 1/12/2022  Authorization Period Expiration: 1/3/23  Plan of Care Certification Period: 6/29/22  Visit # / Visits authorized: 5/ 20  DOS: 1/4/22     Time In: 200 pm  Time Out: 250 pm   Total Billable Time: 50 minutes     Precautions: Standard, s/p L RCR-small with biceps tenodesis  POSTOPERATIVE PLAN: We will follow the arthroscopic rotator cuff repair guidelines for a small size rotator cuff tear.  We discussed with the patient's family after surgery.  The patient will remain in a sling for 6 weeks.  PT to start at 1-2 weeks.     Cuff specific program:  Pendulum exercises and Codman's exercises in 5-7 days, protecting rotator cuff repair for 1 week by avoiding active motion program until 1 week.     PASSIVE ROM: ER side 30 degrees, Forward Flex 90 degrees, ABD - 60 degrees   Full AAROM/PROM starting at 5-7 days as tolerated        Subjective      Pt reports:he is ready to d/c sling today.    he was compliant with home exercise program given last session.   Response to previous treatment:NA  Functional change: NA    Pain: 0/10  Location: left shoulder      Objective   Passive Range of Motion: Measured in degrees: 2/15/22  Shoulder Left Right   Flexion 110 175   Abduction 95 180   ER at 30 30 NT   ER at 90 NT 90   IR 70 @30 abd 80        Maxwell received therapeutic exercises to develop strength, endurance, ROM and posture for 40 minutes including:  UBE 3'/3' min fwd/back (no  resistance)   Pulleys 3'/3' flex/scap  Supine dowel (2#) flexion and ER 3x10 ea  Scapular rows otb 2x15 with 5 sec hold  Shoulder ext otb 2x15 with 5 sec hold  Elbow flexion x30 1# with arm supported-np  Elbow ext stretch x3 min 3#-np  Standing leaning over EOT IR/neutral with towel x3 min  PT reassessment     Maxwell received the following manual therapy techniques: Joint mobilizations and Manual traction were applied to the: L shoulder for 10 minutes, including:  PROM per protocol with AP and inferior grade I/II mobilization and light distraction      Maxwell received ice pack for 00 min to decrease pain and swelling.         Home Exercises Provided and Patient Education Provided     Education provided:   - HEP to Go    Written Home Exercises Provided: Patient instructed to cont prior HEP.  Exercises were reviewed and Maxwell was able to demonstrate them prior to the end of the session.  Maxwell demonstrated good  understanding of the education provided.     See EMR under Patient Instructions for exercises provided     Assessment     Upon reassessment, pt are making good progress toward PROM goals and continues to tolerate all AAROM well with no c/o pain. Continue to progress as tolerated next session.   Maxwell Is progressing well towards his goals.   Pt prognosis is Good.     Pt will continue to benefit from skilled outpatient physical therapy to address the deficits listed in the problem list box on initial evaluation, provide pt/family education and to maximize pt's level of independence in the home and community environment.     Pt's spiritual, cultural and educational needs considered and pt agreeable to plan of care and goals.    Anticipated barriers to physical therapy: none    Goals:   Short Term Goals: (4 weeks)   - Pt will increase ROM to 100 deg L shoulder flex and abd (Progressing, not met)  - Decrease Pain to 4/10 as worst with all PT interventions (Progressing, not met)  - Pt to self correct posture with minimal cues  (Progressing, not met)  - Pt independent with HEP with progressions. (Progressing, not met)     Long Term Goals (24 Weeks):  - Pt will increase ROM to 180 deg L shoulder flex and abd, 90 deg ER, 80 deg IR (Progressing, not met)  - Pt will increase strength to 5/5 LUE in all planes (Progressing, not met)  - Decrease Pain to 0/10 with lifting 35# for work specific tasks (Progressing, not met)  - Pt to return to 80% PLOF (Progressing, not met)    Plan     Continue POC per pt tolerance progressing shoulder ROM and strength per protocol.    Monika Fuchs, PT

## 2022-02-18 ENCOUNTER — OFFICE VISIT (OUTPATIENT)
Dept: SPORTS MEDICINE | Facility: CLINIC | Age: 47
End: 2022-02-18
Payer: COMMERCIAL

## 2022-02-18 VITALS
HEIGHT: 73 IN | BODY MASS INDEX: 30.75 KG/M2 | HEART RATE: 62 BPM | DIASTOLIC BLOOD PRESSURE: 96 MMHG | WEIGHT: 232 LBS | SYSTOLIC BLOOD PRESSURE: 134 MMHG

## 2022-02-18 DIAGNOSIS — Z98.890 S/P ROTATOR CUFF SURGERY: Primary | ICD-10-CM

## 2022-02-18 PROCEDURE — 3080F PR MOST RECENT DIASTOLIC BLOOD PRESSURE >= 90 MM HG: ICD-10-PCS | Mod: CPTII,S$GLB,, | Performed by: ORTHOPAEDIC SURGERY

## 2022-02-18 PROCEDURE — 99999 PR PBB SHADOW E&M-EST. PATIENT-LVL III: CPT | Mod: PBBFAC,,, | Performed by: ORTHOPAEDIC SURGERY

## 2022-02-18 PROCEDURE — 3008F PR BODY MASS INDEX (BMI) DOCUMENTED: ICD-10-PCS | Mod: CPTII,S$GLB,, | Performed by: ORTHOPAEDIC SURGERY

## 2022-02-18 PROCEDURE — 1159F MED LIST DOCD IN RCRD: CPT | Mod: CPTII,S$GLB,, | Performed by: ORTHOPAEDIC SURGERY

## 2022-02-18 PROCEDURE — 3075F SYST BP GE 130 - 139MM HG: CPT | Mod: CPTII,S$GLB,, | Performed by: ORTHOPAEDIC SURGERY

## 2022-02-18 PROCEDURE — 3080F DIAST BP >= 90 MM HG: CPT | Mod: CPTII,S$GLB,, | Performed by: ORTHOPAEDIC SURGERY

## 2022-02-18 PROCEDURE — 3075F PR MOST RECENT SYSTOLIC BLOOD PRESS GE 130-139MM HG: ICD-10-PCS | Mod: CPTII,S$GLB,, | Performed by: ORTHOPAEDIC SURGERY

## 2022-02-18 PROCEDURE — 99024 PR POST-OP FOLLOW-UP VISIT: ICD-10-PCS | Mod: S$GLB,,, | Performed by: ORTHOPAEDIC SURGERY

## 2022-02-18 PROCEDURE — 99999 PR PBB SHADOW E&M-EST. PATIENT-LVL III: ICD-10-PCS | Mod: PBBFAC,,, | Performed by: ORTHOPAEDIC SURGERY

## 2022-02-18 PROCEDURE — 99024 POSTOP FOLLOW-UP VISIT: CPT | Mod: S$GLB,,, | Performed by: ORTHOPAEDIC SURGERY

## 2022-02-18 PROCEDURE — 3008F BODY MASS INDEX DOCD: CPT | Mod: CPTII,S$GLB,, | Performed by: ORTHOPAEDIC SURGERY

## 2022-02-18 PROCEDURE — 1159F PR MEDICATION LIST DOCUMENTED IN MEDICAL RECORD: ICD-10-PCS | Mod: CPTII,S$GLB,, | Performed by: ORTHOPAEDIC SURGERY

## 2022-02-18 NOTE — PROGRESS NOTES
CC left shoulder pain    HISTORY OF PRESENT ILLNESS:   Pt is here today for post-operative followup of his shoulder arthroscopy.  he is doing well.  We have reviewed his findings and discussed plan of care and future treatment options.                         Patient has been attending physical therapy at the [facility] location, working with Monika CARBALLO.    Patient notes he is doing well and has no issues or concerns               SANE post op: 30  SANE pre op: 35        DATE OF PROCEDURE: 01/04/2022  SURGEON: Marly Heart M.D.  OPERATION:   left  1. Shoulder arthroscopic rotator cuff repair (upper 1/8 subscapularis) with Cuff Mend ()  2. Shoulder arthroscopic biceps tenodesis (CPT 20489)  3. Shoulder arthroscopic subacromial decompression, bursectomy   4. Shoulder arthroscopic extensive debridement (anterior, posterior glenohumeral joint, subacromial space) (CPT 37173)  5. Shoulder arthroscopic labral debridement (CPT 92878)  6. Shoulder arthroscopic lysis of adhesions (CPT 16871)  7. Shoulder arthroscopic loose body removal    There was chondral damage to:  Humeral head: 20 x 15 mm grade 3  Glenoid: 5 x 15 mm grade 3  Chondroplasty was performed using arthroscopic shaver.                                              PHYSICAL EXAMINATION:     Incision sites healed well  No evidence of any erythema, infection or induration  elbow Range of motion full   No effusion  2+ DP pulse  No swelling       Forward Flexion: 90  ER: 25  IR: L4    Strength at 0 and 30: 5/5  ER: 5/5                                                                            ASSESSMENT:                                                                                                                                               1. Status post above, doing well.                                                                                                                               PLAN:                                                                                                                                                      1. Continue PT  2. Emphasized scapular function.  3. I have discussed return to activity in detail.  4. he will see us back in 6 weeks.                                      5. All questions were answered and he should contact us if he  has any questions or concerns in the interim.

## 2022-02-22 ENCOUNTER — CLINICAL SUPPORT (OUTPATIENT)
Dept: REHABILITATION | Facility: HOSPITAL | Age: 47
End: 2022-02-22
Payer: COMMERCIAL

## 2022-02-22 DIAGNOSIS — M25.512 ACUTE PAIN OF LEFT SHOULDER: Primary | ICD-10-CM

## 2022-02-22 PROCEDURE — 97110 THERAPEUTIC EXERCISES: CPT

## 2022-02-22 NOTE — PROGRESS NOTES
Physical Therapy Daily Treatment Note     Name: [facility] Number: [id number]    Therapy Diagnosis:   Encounter Diagnosis   Name Primary?    Acute pain of left shoulder Yes     Physician: Karson Thakkar III, *    Visit Date: 2/22/2022    Physician Orders: PT Eval and Treat   Medical Diagnosis:   M75.20 (ICD-10-CM) - Biceps tendinitis, unspecified laterality   S46.012A (ICD-10-CM) - Traumatic tear of left rotator cuff, unspecified tear extent, initial encounter         Date of Surgery: 1/4/22  Evaluation Date: 1/12/2022  Authorization Period Expiration: 1/3/23  Plan of Care Certification Period: 6/29/22  Visit # / Visits authorized: 6/ 20  DOS: 1/4/22     Time In: 200 pm  Time Out: 305 pm   Total Billable Time: 55 minutes     Precautions: Standard, s/p L RCR-small with biceps tenodesis  POSTOPERATIVE PLAN: We will follow the arthroscopic rotator cuff repair guidelines for a small size rotator cuff tear.  We discussed with the patient's family after surgery.  The patient will remain in a sling for 6 weeks.  PT to start at 1-2 weeks.     Cuff specific program:  Pendulum exercises and Codman's exercises in 5-7 days, protecting rotator cuff repair for 1 week by avoiding active motion program until 1 week.     PASSIVE ROM: ER side 30 degrees, Forward Flex 90 degrees, ABD - 60 degrees   Full AAROM/PROM starting at 5-7 days as tolerated        Subjective      Pt reports:he is feeling good, just some twitching occasionally in muscles.    he was compliant with home exercise program given last session.   Response to previous treatment:NA  Functional change: NA    Pain: 0/10  Location: left shoulder      Objective   Passive Range of Motion: Measured in degrees: 2/15/22  Shoulder Left Right   Flexion 110 175   Abduction 95 180   ER at 30 30 NT   ER at 90 NT 90   IR 70 @30 abd 80        Maxwell received therapeutic exercises to develop strength, endurance, ROM and posture for 55 minutes  including:  UBE 4'/4' min fwd/back R2  Pulleys 3'/3' flex/scap  Standing shoulder flex, scap and abd 2x10 ea  4 way shoulder isometrics with towel 3x 30 sec ea  Scapular rows otb 2x15 with 5 sec hold  Shoulder ext otb 2x15 with 5 sec hold  Standing leaning over EOT IR/neutral with towel x3 min-np  Wall slide x30 with 5 sec hold   PROM  In all planes x5 min  ER stretch 2# x2 min    Maxwell received the following manual therapy techniques: Joint mobilizations and Manual traction were applied to the: L shoulder for 00 minutes, including:  PROM per protocol with AP and inferior grade I/II mobilization and light distraction      Maxwell received ice pack for 00 min to decrease pain and swelling.         Home Exercises Provided and Patient Education Provided     Education provided:   - HEP to Go    Written Home Exercises Provided: Patient instructed to cont prior HEP.  Exercises were reviewed and Maxwell was able to demonstrate them prior to the end of the session.  Maxwell demonstrated good  understanding of the education provided.     See EMR under Patient Instructions for exercises provided     Assessment     Pt was able to progress well with additional AAROM with wall slide and AROM to 90 shoulder. He was provided updated HEP and demonstrated good understanding. Continue to progress as tolerated next session.   Maxwell Is progressing well towards his goals.   Pt prognosis is Good.     Pt will continue to benefit from skilled outpatient physical therapy to address the deficits listed in the problem list box on initial evaluation, provide pt/family education and to maximize pt's level of independence in the home and community environment.     Pt's spiritual, cultural and educational needs considered and pt agreeable to plan of care and goals.    Anticipated barriers to physical therapy: none    Goals:   Short Term Goals: (4 weeks)   - Pt will increase ROM to 100 deg L shoulder flex and abd (Progressing, not met)  - Decrease Pain to 4/10 as  worst with all PT interventions (Progressing, not met)  - Pt to self correct posture with minimal cues (Progressing, not met)  - Pt independent with HEP with progressions. (Progressing, not met)     Long Term Goals (24 Weeks):  - Pt will increase ROM to 180 deg L shoulder flex and abd, 90 deg ER, 80 deg IR (Progressing, not met)  - Pt will increase strength to 5/5 LUE in all planes (Progressing, not met)  - Decrease Pain to 0/10 with lifting 35# for work specific tasks (Progressing, not met)  - Pt to return to 80% PLOF (Progressing, not met)    Plan     Continue POC per pt tolerance progressing shoulder ROM and strength per protocol.    Monika Fuchs, PT

## 2022-02-25 ENCOUNTER — CLINICAL SUPPORT (OUTPATIENT)
Dept: REHABILITATION | Facility: HOSPITAL | Age: 47
End: 2022-02-25
Payer: COMMERCIAL

## 2022-02-25 DIAGNOSIS — M25.512 ACUTE PAIN OF LEFT SHOULDER: Primary | ICD-10-CM

## 2022-02-25 PROCEDURE — 97140 MANUAL THERAPY 1/> REGIONS: CPT | Mod: CQ

## 2022-02-25 PROCEDURE — 97110 THERAPEUTIC EXERCISES: CPT | Mod: CQ

## 2022-02-25 NOTE — PROGRESS NOTES
Physical Therapy Daily Treatment Note     Name: [facility] Number: [id number]    Therapy Diagnosis:   Encounter Diagnosis   Name Primary?    Acute pain of left shoulder Yes     Physician: Karson Thakkar III, *    Visit Date: 2/25/2022    Physician Orders: PT Eval and Treat   Medical Diagnosis:   M75.20 (ICD-10-CM) - Biceps tendinitis, unspecified laterality   S46.012A (ICD-10-CM) - Traumatic tear of left rotator cuff, unspecified tear extent, initial encounter         Date of Surgery: 1/4/22  Evaluation Date: 1/12/2022  Authorization Period Expiration: 1/3/23  Plan of Care Certification Period: 6/29/22  Visit # / Visits authorized: 7/ 20  DOS: 1/4/22     Time In: 0745 a  Time Out: 0845 a  Total Billable Time: 60 minutes     Precautions: Standard, s/p L RCR-small with biceps tenodesis  POSTOPERATIVE PLAN: We will follow the arthroscopic rotator cuff repair guidelines for a small size rotator cuff tear.  We discussed with the patient's family after surgery.  The patient will remain in a sling for 6 weeks.  PT to start at 1-2 weeks.     Cuff specific program:  Pendulum exercises and Codman's exercises in 5-7 days, protecting rotator cuff repair for 1 week by avoiding active motion program until 1 week.     PASSIVE ROM: ER side 30 degrees, Forward Flex 90 degrees, ABD - 60 degrees   Full AAROM/PROM starting at 5-7 days as tolerated        Subjective      Pt reports: Shoulder is doing good.   he was compliant with home exercise program given last session.   Response to previous treatment:NA  Functional change: NA    Pain: 0/10  Location: left shoulder      Objective   Passive Range of Motion: Measured in degrees: 2/25/22  Shoulder Left Right   Flexion 115 175   Abduction 95-   ER at 45 30 NT   ER at 90 NT 90   IR To stomach 80        Maxwell received therapeutic exercises to develop strength, endurance, ROM and posture for 45 minutes including:  UBE 4'/4' min fwd/back R2  Pulleys 3'/3'  flex/scap (used wand today)  Standing shoulder flex, scap and abd 2x10 ea  4 way shoulder isometrics with towel 3x 30 sec ea  Scapular rows otb 2x15 with 5 sec hold  Shoulder ext otb 2x15 with 5 sec hold  Wall slide x30 with 5 sec hold   ER stretch 2# x2 min    Mxawell received the following manual therapy techniques: Joint mobilizations and Manual traction were applied to the: L shoulder for 10 minutes, including:  PROM per protocol with AP and inferior grade I/II mobilization and light distraction      Maxwell received ice pack for 00 min to decrease pain and swelling.         Home Exercises Provided and Patient Education Provided     Education provided:   - HEP to Go    Written Home Exercises Provided: Patient instructed to cont prior HEP.  Exercises were reviewed and Maxwell was able to demonstrate them prior to the end of the session.  Maxwell demonstrated good  understanding of the education provided.     See EMR under Patient Instructions for exercises provided     Assessment     PROM is slowly progressing. Excessive L scap upward rot w/ shoulder elevation 2* to LT and SA and RC weakness. Overall progressing as expected this far in recovery.   Maxwell Is progressing well towards his goals.   Pt prognosis is Good.     Pt will continue to benefit from skilled outpatient physical therapy to address the deficits listed in the problem list box on initial evaluation, provide pt/family education and to maximize pt's level of independence in the home and community environment.     Pt's spiritual, cultural and educational needs considered and pt agreeable to plan of care and goals.    Anticipated barriers to physical therapy: none    Goals:   Short Term Goals: (4 weeks)   - Pt will increase ROM to 100 deg L shoulder flex and abd (Progressing, not met)  - Decrease Pain to 4/10 as worst with all PT interventions (Progressing, not met)  - Pt to self correct posture with minimal cues (Progressing, not met)  - Pt independent with HEP with  progressions. (Progressing, not met)     Long Term Goals (24 Weeks):  - Pt will increase ROM to 180 deg L shoulder flex and abd, 90 deg ER, 80 deg IR (Progressing, not met)  - Pt will increase strength to 5/5 LUE in all planes (Progressing, not met)  - Decrease Pain to 0/10 with lifting 35# for work specific tasks (Progressing, not met)  - Pt to return to 80% PLOF (Progressing, not met)    Plan     Continue POC per pt tolerance progressing shoulder ROM and strength per protocol.    Juany Fofana, PTA

## 2022-03-08 ENCOUNTER — CLINICAL SUPPORT (OUTPATIENT)
Dept: REHABILITATION | Facility: HOSPITAL | Age: 47
End: 2022-03-08
Payer: COMMERCIAL

## 2022-03-08 DIAGNOSIS — M25.512 ACUTE PAIN OF LEFT SHOULDER: Primary | ICD-10-CM

## 2022-03-08 PROCEDURE — 97140 MANUAL THERAPY 1/> REGIONS: CPT

## 2022-03-08 PROCEDURE — 97110 THERAPEUTIC EXERCISES: CPT

## 2022-03-08 NOTE — PROGRESS NOTES
Physical Therapy Daily Treatment Note     Name: [facility] Number: [id number]    Therapy Diagnosis:   Encounter Diagnosis   Name Primary?    Acute pain of left shoulder Yes     Physician: Karson Thakkar III, *    Visit Date: 3/8/2022    Physician Orders: PT Eval and Treat   Medical Diagnosis:   M75.20 (ICD-10-CM) - Biceps tendinitis, unspecified laterality   S46.012A (ICD-10-CM) - Traumatic tear of left rotator cuff, unspecified tear extent, initial encounter         Date of Surgery: 1/4/22  Evaluation Date: 1/12/2022  Authorization Period Expiration: 1/3/23  Plan of Care Certification Period: 6/29/22  Visit # / Visits authorized: 8/ 20  DOS: 1/4/22     Time In: 200 pm  Time Out: 255 pm  Total Billable Time: 55 minutes     Precautions: Standard, s/p L RCR-small with biceps tenodesis  POSTOPERATIVE PLAN: We will follow the arthroscopic rotator cuff repair guidelines for a small size rotator cuff tear.  We discussed with the patient's family after surgery.  The patient will remain in a sling for 6 weeks.  PT to start at 1-2 weeks.     Cuff specific program:  Pendulum exercises and Codman's exercises in 5-7 days, protecting rotator cuff repair for 1 week by avoiding active motion program until 1 week.     PASSIVE ROM: ER side 30 degrees, Forward Flex 90 degrees, ABD - 60 degrees   Full AAROM/PROM starting at 5-7 days as tolerated        Subjective      Pt reports: he slept on his shoulder wrong the other day and had a hard time with ROM, but it is better now.   he was compliant with home exercise program given last session.   Response to previous treatment:NA  Functional change: NA    Pain: 0/10  Location: left shoulder      Objective   Passive Range of Motion: Measured in degrees: 2/25/22  Shoulder Left Right   Flexion 115 175   Abduction 95-   ER at 45 30 NT   ER at 90 NT 90   IR To stomach 80        Maxwell received therapeutic exercises to develop strength, endurance, ROM and  posture for 45 minutes including:  UBE 4'/4' min fwd/back R2  Pulleys 3'/3' flex/scap (used wand today)  Standing shoulder flex, scap and abd 3x10 ea 1#  Step outs IR/ER ytb 2x10 with 5 sec hold L  SL shoulder ER, flex and abd 2x10  Scapular rows otb 2x15 with 5 sec hold-np  Shoulder ext otb 2x15 with 5 sec hold-np  Wall slide x30 with 5 sec hold   ER stretch 2# x2 min    Maxwell received the following manual therapy techniques: Joint mobilizations and Manual traction were applied to the: L shoulder for 10 minutes, including:  PROM per protocol with AP and inferior grade I/II mobilization and light distraction      Maxwell received ice pack for 00 min to decrease pain and swelling.         Home Exercises Provided and Patient Education Provided     Education provided:   - HEP to Go    Written Home Exercises Provided: Patient instructed to cont prior HEP.  Exercises were reviewed and Maxwell was able to demonstrate them prior to the end of the session.  Maxwell demonstrated good  understanding of the education provided.     See EMR under Patient Instructions for exercises provided     Assessment     Pt tolerated new interventions well tolerating light resistance and side lying interventions well. He is still limited with PROM secondary to guarding so pt was educated to add shoulder propping 1x daily for 5-10 min along with other ROM exercises.   Maxwell Is progressing well towards his goals.   Pt prognosis is Good.     Pt will continue to benefit from skilled outpatient physical therapy to address the deficits listed in the problem list box on initial evaluation, provide pt/family education and to maximize pt's level of independence in the home and community environment.     Pt's spiritual, cultural and educational needs considered and pt agreeable to plan of care and goals.    Anticipated barriers to physical therapy: none    Goals:   Short Term Goals: (4 weeks)   - Pt will increase ROM to 100 deg L shoulder flex and abd (Progressing,  not met)  - Decrease Pain to 4/10 as worst with all PT interventions (Progressing, not met)  - Pt to self correct posture with minimal cues (Progressing, not met)  - Pt independent with HEP with progressions. (Progressing, not met)     Long Term Goals (24 Weeks):  - Pt will increase ROM to 180 deg L shoulder flex and abd, 90 deg ER, 80 deg IR (Progressing, not met)  - Pt will increase strength to 5/5 LUE in all planes (Progressing, not met)  - Decrease Pain to 0/10 with lifting 35# for work specific tasks (Progressing, not met)  - Pt to return to 80% PLOF (Progressing, not met)    Plan     Continue POC per pt tolerance progressing shoulder ROM and strength per protocol.    Monika Fuchs, PT

## 2022-03-11 ENCOUNTER — CLINICAL SUPPORT (OUTPATIENT)
Dept: REHABILITATION | Facility: HOSPITAL | Age: 47
End: 2022-03-11
Payer: COMMERCIAL

## 2022-03-11 DIAGNOSIS — M25.512 ACUTE PAIN OF LEFT SHOULDER: Primary | ICD-10-CM

## 2022-03-11 PROCEDURE — 97140 MANUAL THERAPY 1/> REGIONS: CPT | Mod: CQ

## 2022-03-11 PROCEDURE — 97110 THERAPEUTIC EXERCISES: CPT | Mod: CQ

## 2022-03-11 NOTE — PROGRESS NOTES
Physical Therapy Daily Treatment Note     Name: Maxwell [facility] Number: [id number]    Therapy Diagnosis:   Encounter Diagnosis   Name Primary?    Acute pain of left shoulder Yes     Physician: Karson Thakkar III, *    Visit Date: 3/11/2022    Physician Orders: PT Eval and Treat   Medical Diagnosis:   M75.20 (ICD-10-CM) - Biceps tendinitis, unspecified laterality   S46.012A (ICD-10-CM) - Traumatic tear of left rotator cuff, unspecified tear extent, initial encounter         Date of Surgery: 1/4/22  Evaluation Date: 1/12/2022  Authorization Period Expiration: 1/3/23  Plan of Care Certification Period: 6/29/22  Visit # / Visits authorized: 8/ 20  DOS: 1/4/22     Time In: 1100 pm  Time Out: 1200 pm  Total Billable Time: 60 minutes     Precautions: Standard, s/p L RCR-small with biceps tenodesis  POSTOPERATIVE PLAN: We will follow the arthroscopic rotator cuff repair guidelines for a small size rotator cuff tear.  We discussed with the patient's family after surgery.  The patient will remain in a sling for 6 weeks.  PT to start at 1-2 weeks.     Cuff specific program:  Pendulum exercises and Codman's exercises in 5-7 days, protecting rotator cuff repair for 1 week by avoiding active motion program until 1 week.     PASSIVE ROM: ER side 30 degrees, Forward Flex 90 degrees, ABD - 60 degrees   Full AAROM/PROM starting at 5-7 days as tolerated        Subjective      Pt reports: he slept on his shoulder wrong the other day and had a hard time with ROM, but it is better now.   he was compliant with home exercise program given last session.   Response to previous treatment:NA  Functional change: NA    Pain: 0/10  Location: left shoulder      Objective   Passive Range of Motion: Measured in degrees: 2/25/22  Shoulder Left Right   Flexion 115 175   Abduction 95-   ER at 45 30 NT   ER at 90 NT 90   IR To stomach 80        Maxwell received therapeutic exercises to develop strength, endurance, ROM and  posture for 50 minutes including:  UBE 4'/4' min fwd/back R2  Pulleys 3'/3' flex/scap   Standing shoulder flex, scap and abd 3x10 ea 1#  Step outs IR/ER ytb 2x10 with 5 sec hold L  SL shoulder ER, flex and abd 2x10  Scapular rows otb 2x15 with 5 sec hold-np  Shoulder ext otb 2x15 with 5 sec hold-np  Wall slide x30 with 5 sec hold   ER stretch 2# x2 min    Maxwell received the following manual therapy techniques: Joint mobilizations and Manual traction were applied to the: L shoulder for 10 minutes, including:  PROM per protocol with AP and inferior grade I/II mobilization and light distraction      Maxwell received ice pack for 00 min to decrease pain and swelling.         Home Exercises Provided and Patient Education Provided     Education provided:   - HEP to Go    Written Home Exercises Provided: Patient instructed to cont prior HEP.  Exercises were reviewed and Maxwell was able to demonstrate them prior to the end of the session.  Maxwell demonstrated good  understanding of the education provided.     See EMR under Patient Instructions for exercises provided     Assessment     Better FL ROM w/ AA vs PROM 2* guarding. PROM improved w/ joint mobs inferior and posterior.  VC for scap stab during session.   Maxwell Is progressing well towards his goals.   Pt prognosis is Good.     Pt will continue to benefit from skilled outpatient physical therapy to address the deficits listed in the problem list box on initial evaluation, provide pt/family education and to maximize pt's level of independence in the home and community environment.     Pt's spiritual, cultural and educational needs considered and pt agreeable to plan of care and goals.    Anticipated barriers to physical therapy: none    Goals:   Short Term Goals: (4 weeks)   - Pt will increase ROM to 100 deg L shoulder flex and abd (Progressing, not met)  - Decrease Pain to 4/10 as worst with all PT interventions (Progressing, not met)  - Pt to self correct posture with minimal cues  (Progressing, not met)  - Pt independent with HEP with progressions. (Progressing, not met)     Long Term Goals (24 Weeks):  - Pt will increase ROM to 180 deg L shoulder flex and abd, 90 deg ER, 80 deg IR (Progressing, not met)  - Pt will increase strength to 5/5 LUE in all planes (Progressing, not met)  - Decrease Pain to 0/10 with lifting 35# for work specific tasks (Progressing, not met)  - Pt to return to 80% PLOF (Progressing, not met)    Plan     Continue POC per pt tolerance progressing shoulder ROM and strength per protocol.    Juany Fofana, PTA

## 2022-03-15 ENCOUNTER — CLINICAL SUPPORT (OUTPATIENT)
Dept: REHABILITATION | Facility: HOSPITAL | Age: 47
End: 2022-03-15
Payer: COMMERCIAL

## 2022-03-15 ENCOUNTER — PATIENT MESSAGE (OUTPATIENT)
Dept: REHABILITATION | Facility: HOSPITAL | Age: 47
End: 2022-03-15

## 2022-03-15 DIAGNOSIS — M25.512 ACUTE PAIN OF LEFT SHOULDER: Primary | ICD-10-CM

## 2022-03-15 PROCEDURE — 97140 MANUAL THERAPY 1/> REGIONS: CPT

## 2022-03-15 PROCEDURE — 97110 THERAPEUTIC EXERCISES: CPT

## 2022-03-15 NOTE — PROGRESS NOTES
Physical Therapy Daily Treatment Note     Name: Maxwell [facility] Number: [id number]    Therapy Diagnosis:   Encounter Diagnosis   Name Primary?    Acute pain of left shoulder Yes     Physician: Karson Thakkar III, *    Visit Date: 3/15/2022    Physician Orders: PT Eval and Treat   Medical Diagnosis:   M75.20 (ICD-10-CM) - Biceps tendinitis, unspecified laterality   S46.012A (ICD-10-CM) - Traumatic tear of left rotator cuff, unspecified tear extent, initial encounter         Date of Surgery: 1/4/22  Evaluation Date: 1/12/2022  Authorization Period Expiration: 1/3/23  Plan of Care Certification Period: 6/29/22  Visit # / Visits authorized: 10/ 20  DOS: 1/4/22     Time In: 200 pm  Time Out: 255 pm  Total Billable Time: 55 minutes     Precautions: Standard, s/p L RCR-small with biceps tenodesis  POSTOPERATIVE PLAN: We will follow the arthroscopic rotator cuff repair guidelines for a small size rotator cuff tear.  We discussed with the patient's family after surgery.  The patient will remain in a sling for 6 weeks.  PT to start at 1-2 weeks.     Cuff specific program:  Pendulum exercises and Codman's exercises in 5-7 days, protecting rotator cuff repair for 1 week by avoiding active motion program until 1 week.     PASSIVE ROM: ER side 30 degrees, Forward Flex 90 degrees, ABD - 60 degrees   Full AAROM/PROM starting at 5-7 days as tolerated        Subjective      Pt reports: he is feeling good, but he didn't do his exercises for 3 days and it really caught up with him.    he was compliant with home exercise program given last session.   Response to previous treatment:NA  Functional change: NA    Pain: 0/10  Location: left shoulder      Objective   Passive Range of Motion: Measured in degrees: 2/25/22  Shoulder Left Right   Flexion 115 175   Abduction 95-   ER at 45 30 NT   ER at 90 NT 90   IR To stomach 80        Maxwell received therapeutic exercises to develop strength, endurance, ROM and  posture for 45 minutes including:  UBE 4'/4' min fwd/back R2  Pulleys 3'/3' flex/scap   Standing shoulder flex, scap and abd 3x10 ea 1#-np  Step outs IR/ER ytb 3x10 with 5 sec hold L  SL shoulder ER, flex and abd 2x10-np  Scapular rows gtb 2x15 with 5 sec hold  Shoulder ext gtb 2x15 with 5 sec hold-np  Wall slide x30 with 5 sec hold   ER stretch 2# x2 min-np    Maxwell received the following manual therapy techniques: Joint mobilizations and Manual traction were applied to the: L shoulder for 10 minutes, including:  PROM per protocol with AP and inferior grade I/II mobilization and light distraction      Maxwell received ice pack for 00 min to decrease pain and swelling.         Home Exercises Provided and Patient Education Provided     Education provided:   - HEP to Go    Written Home Exercises Provided: Patient instructed to cont prior HEP.  Exercises were reviewed and Maxwell was able to demonstrate them prior to the end of the session.  Maxwell demonstrated good  understanding of the education provided.     See EMR under Patient Instructions for exercises provided     Assessment     Pt making improvements with PROM, especially noted with flexion. Pt re-education to perform PROM interventions 2-3x a day. Pt demonstrated understanding and plan to progress as tolerated next session.   Maxwell Is progressing well towards his goals.   Pt prognosis is Good.     Pt will continue to benefit from skilled outpatient physical therapy to address the deficits listed in the problem list box on initial evaluation, provide pt/family education and to maximize pt's level of independence in the home and community environment.     Pt's spiritual, cultural and educational needs considered and pt agreeable to plan of care and goals.    Anticipated barriers to physical therapy: none    Goals:   Short Term Goals: (4 weeks)   - Pt will increase ROM to 100 deg L shoulder flex and abd (Progressing, not met)  - Decrease Pain to 4/10 as worst with all PT  interventions (Progressing, not met)  - Pt to self correct posture with minimal cues (Progressing, not met)  - Pt independent with HEP with progressions. (Progressing, not met)     Long Term Goals (24 Weeks):  - Pt will increase ROM to 180 deg L shoulder flex and abd, 90 deg ER, 80 deg IR (Progressing, not met)  - Pt will increase strength to 5/5 LUE in all planes (Progressing, not met)  - Decrease Pain to 0/10 with lifting 35# for work specific tasks (Progressing, not met)  - Pt to return to 80% PLOF (Progressing, not met)    Plan     Continue POC per pt tolerance progressing shoulder ROM and strength per protocol.    Monika Fuchs, PT

## 2022-03-18 ENCOUNTER — CLINICAL SUPPORT (OUTPATIENT)
Dept: REHABILITATION | Facility: HOSPITAL | Age: 47
End: 2022-03-18
Payer: COMMERCIAL

## 2022-03-18 DIAGNOSIS — M25.512 ACUTE PAIN OF LEFT SHOULDER: Primary | ICD-10-CM

## 2022-03-18 PROCEDURE — 97110 THERAPEUTIC EXERCISES: CPT | Mod: CQ

## 2022-03-18 PROCEDURE — 97140 MANUAL THERAPY 1/> REGIONS: CPT | Mod: CQ

## 2022-03-18 NOTE — PROGRESS NOTES
Physical Therapy Daily Treatment Note     Name: Maxwell [facility] Number: [id number]    Therapy Diagnosis:   Encounter Diagnosis   Name Primary?    Acute pain of left shoulder Yes     Physician: Karson Thakkar III, *    Visit Date: 3/18/2022    Physician Orders: PT Eval and Treat   Medical Diagnosis:   M75.20 (ICD-10-CM) - Biceps tendinitis, unspecified laterality   S46.012A (ICD-10-CM) - Traumatic tear of left rotator cuff, unspecified tear extent, initial encounter         Date of Surgery: 1/4/22  Evaluation Date: 1/12/2022  Authorization Period Expiration: 1/3/23  Plan of Care Certification Period: 6/29/22  Visit # / Visits authorized: 11/ 20  DOS: 1/4/22     Time In: 0705 pm  Time Out: 0800 pm  Total Billable Time: 55 minutes     Precautions: Standard, s/p L RCR-small with biceps tenodesis  POSTOPERATIVE PLAN: We will follow the arthroscopic rotator cuff repair guidelines for a small size rotator cuff tear.  We discussed with the patient's family after surgery.  The patient will remain in a sling for 6 weeks.  PT to start at 1-2 weeks.     Cuff specific program:  Pendulum exercises and Codman's exercises in 5-7 days, protecting rotator cuff repair for 1 week by avoiding active motion program until 1 week.     PASSIVE ROM: ER side 30 degrees, Forward Flex 90 degrees, ABD - 60 degrees   Full AAROM/PROM starting at 5-7 days as tolerated        Subjective      Pt reports: he is feeling good, but he didn't do his exercises for 3 days and it really caught up with him.    he was compliant with home exercise program given last session.   Response to previous treatment:NA  Functional change: NA    Pain: 0/10  Location: left shoulder      Objective   Passive Range of Motion: Measured in degrees: 2/25/22  Shoulder Left Right   Flexion 115 175   Abduction 95-   ER at 45 30 NT   ER at 90 NT 90   IR To stomach 80        Maxwell received therapeutic exercises to develop strength, endurance, ROM and  posture for 45 minutes including:  UBE 4'/4' min fwd/back R2  ABD prop w/ #4 wt x 10 min  Pulleys 3'/3' flex/scap   Standing shoulder flex, scap and abd 3x10 ea 1#-np  Step outs IR/ER ytb 3x10 with 5 sec hold L  SL shoulder ER, flex and abd 2x10-np  Scapular rows gtb 2x15 with 5 sec hold  Shoulder ext gtb 2x15 with 5 sec hold-np  Wall slide x30 with 5 sec hold   ER stretch 2# x2 min-NP    Maxwell received the following manual therapy techniques: Joint mobilizations and Manual traction were applied to the: L shoulder for 10 minutes, including:  PROM per protocol with AP and inferior grade I/II mobilization and light distraction      Maxwell received ice pack for 00 min to decrease pain and swelling.         Home Exercises Provided and Patient Education Provided     Education provided:   - HEP to Go    Written Home Exercises Provided: Patient instructed to cont prior HEP.  Exercises were reviewed and Maxwell was able to demonstrate them prior to the end of the session.  Maxwell demonstrated good  understanding of the education provided.     See EMR under Patient Instructions for exercises provided     Assessment     Still limited w/ ROM mainly due to decreased GH joint inferior and posterior mobility as well as guarding. Noted pinching felt w/ ER PROM @ 90 which improved after more GH inferior and posterior mobs. Added abd prop w/ #4 to improved inferior GH joint mobility.   Maxwell Is progressing well towards his goals.   Pt prognosis is Good.     Pt will continue to benefit from skilled outpatient physical therapy to address the deficits listed in the problem list box on initial evaluation, provide pt/family education and to maximize pt's level of independence in the home and community environment.     Pt's spiritual, cultural and educational needs considered and pt agreeable to plan of care and goals.    Anticipated barriers to physical therapy: none    Goals:   Short Term Goals: (4 weeks)   - Pt will increase ROM to 100 deg L  shoulder flex and abd (Progressing, not met)  - Decrease Pain to 4/10 as worst with all PT interventions (Progressing, not met)  - Pt to self correct posture with minimal cues (Progressing, not met)  - Pt independent with HEP with progressions. (Progressing, not met)     Long Term Goals (24 Weeks):  - Pt will increase ROM to 180 deg L shoulder flex and abd, 90 deg ER, 80 deg IR (Progressing, not met)  - Pt will increase strength to 5/5 LUE in all planes (Progressing, not met)  - Decrease Pain to 0/10 with lifting 35# for work specific tasks (Progressing, not met)  - Pt to return to 80% PLOF (Progressing, not met)    Plan     Continue POC per pt tolerance progressing shoulder ROM and strength per protocol.    Juany Fofana, PTA

## 2022-03-22 ENCOUNTER — CLINICAL SUPPORT (OUTPATIENT)
Dept: REHABILITATION | Facility: HOSPITAL | Age: 47
End: 2022-03-22
Attending: ORTHOPAEDIC SURGERY
Payer: COMMERCIAL

## 2022-03-22 DIAGNOSIS — M25.512 ACUTE PAIN OF LEFT SHOULDER: Primary | ICD-10-CM

## 2022-03-22 PROCEDURE — 97140 MANUAL THERAPY 1/> REGIONS: CPT

## 2022-03-22 PROCEDURE — 97110 THERAPEUTIC EXERCISES: CPT

## 2022-03-22 NOTE — PROGRESS NOTES
Physical Therapy Daily Treatment Note     Name: Maxwell [facility] Number: [id number]    Therapy Diagnosis:   Encounter Diagnosis   Name Primary?    Acute pain of left shoulder Yes     Physician: Karson Thakkar III, *    Visit Date: 3/22/2022    Physician Orders: PT Eval and Treat   Medical Diagnosis:   M75.20 (ICD-10-CM) - Biceps tendinitis, unspecified laterality   S46.012A (ICD-10-CM) - Traumatic tear of left rotator cuff, unspecified tear extent, initial encounter         Date of Surgery: 1/4/22  Evaluation Date: 1/12/2022  Authorization Period Expiration: 1/3/23  Plan of Care Certification Period: 6/29/22  Visit # / Visits authorized: 12/ 20  DOS: 1/4/22     Time In: 200 pm  Time Out: 300 pm  Total Billable Time: 60 minutes     Precautions: Standard, s/p L RCR-small with biceps tenodesis  POSTOPERATIVE PLAN: We will follow the arthroscopic rotator cuff repair guidelines for a small size rotator cuff tear.  We discussed with the patient's family after surgery.  The patient will remain in a sling for 6 weeks.  PT to start at 1-2 weeks.     Cuff specific program:  Pendulum exercises and Codman's exercises in 5-7 days, protecting rotator cuff repair for 1 week by avoiding active motion program until 1 week.     PASSIVE ROM: ER side 30 degrees, Forward Flex 90 degrees, ABD - 60 degrees   Full AAROM/PROM starting at 5-7 days as tolerated        Subjective      Pt reports:he is feeling better.    he was compliant with home exercise program given last session.   Response to previous treatment:NA  Functional change: NA    Pain: 0/10  Location: left shoulder      Objective   Passive Range of Motion: Measured in degrees: 3/22/22  Shoulder Left Right   Flexion 160 175   Abduction 150 180   ER at 90 55 90   IR 75 80        Maxwell received therapeutic exercises to develop strength, endurance, ROM and posture for 45 minutes including:  UBE 4'/4' min fwd/back R2  ABD prop w/ #4 wt x 10 min- np  Pulleys  "3'/3' flex/scap - np  Ball on wall roll up 2 x 10 x 5" hold  Standing shoulder flex, scap and abd 3x10 ea 1#  Step outs IR/ER ytb 3x10 with 5 sec hold L- cuing shoulder blade  SL shoulder ER, flex and abd 2x10-np  Scapular rows gtb 2x15 with 5 sec hold  Shoulder ext gtb 2x15 with 5 sec hold-np  Wall slide x30 with 5 sec hold- np  ER stretch 2# x2 min-NP  90/90 doorway stretch L 4 x 30"  CC pulldown 17 lb with stretch 3 x 10       Maxwell received the following manual therapy techniques: Joint mobilizations and Manual traction were applied to the: L shoulder for 10 minutes, including:  PROM per protocol with AP and inferior grade I/II mobilization and light distraction  Prone IR mobilization      Maxwell received ice pack for 00 min to decrease pain and swelling.         Home Exercises Provided and Patient Education Provided     Education provided:   - HEP to Go    Written Home Exercises Provided: Patient instructed to cont prior HEP.  Exercises were reviewed and Maxwell was able to demonstrate them prior to the end of the session.  Maxwell demonstrated good  understanding of the education provided.     See EMR under Patient Instructions for exercises provided     Assessment     Pt tolerated more ROM interventions and demonstrated improved IR and ER during reassessment. Pt making good progress toward functional goals and plan to continue progressing as tolerated.   Maxwell Is progressing well towards his goals.   Pt prognosis is Good.     Pt will continue to benefit from skilled outpatient physical therapy to address the deficits listed in the problem list box on initial evaluation, provide pt/family education and to maximize pt's level of independence in the home and community environment.     Pt's spiritual, cultural and educational needs considered and pt agreeable to plan of care and goals.    Anticipated barriers to physical therapy: none    Goals:   Short Term Goals: (4 weeks)   - Pt will increase ROM to 100 deg L shoulder flex " and abd (met)  - Decrease Pain to 4/10 as worst with all PT interventions (met)  - Pt to self correct posture with minimal cues (met)  - Pt independent with HEP with progressions. (met)     Long Term Goals (24 Weeks):  - Pt will increase ROM to 180 deg L shoulder flex and abd, 90 deg ER, 80 deg IR (Progressing, not met)  - Pt will increase strength to 5/5 LUE in all planes (Progressing, not met)  - Decrease Pain to 0/10 with lifting 35# for work specific tasks (Progressing, not met)  - Pt to return to 80% PLOF (Progressing, not met)    Plan     Continue POC per pt tolerance progressing shoulder ROM and strength per protocol.    Monika Fuchs, PT

## 2022-03-25 ENCOUNTER — CLINICAL SUPPORT (OUTPATIENT)
Dept: REHABILITATION | Facility: HOSPITAL | Age: 47
End: 2022-03-25
Payer: COMMERCIAL

## 2022-03-25 DIAGNOSIS — M25.512 ACUTE PAIN OF LEFT SHOULDER: Primary | ICD-10-CM

## 2022-03-25 PROCEDURE — 97110 THERAPEUTIC EXERCISES: CPT | Mod: CQ

## 2022-03-25 PROCEDURE — 97140 MANUAL THERAPY 1/> REGIONS: CPT | Mod: CQ

## 2022-03-25 NOTE — PROGRESS NOTES
Physical Therapy Daily Treatment Note     Name: Maxwell [facility] Number: [id number]    Therapy Diagnosis:   Encounter Diagnosis   Name Primary?    Acute pain of left shoulder Yes     Physician: Karson Thakkar III, *    Visit Date: 3/25/2022    Physician Orders: PT Eval and Treat   Medical Diagnosis:   M75.20 (ICD-10-CM) - Biceps tendinitis, unspecified laterality   S46.012A (ICD-10-CM) - Traumatic tear of left rotator cuff, unspecified tear extent, initial encounter         Date of Surgery: 1/4/22  Evaluation Date: 1/12/2022  Authorization Period Expiration: 1/3/23  Plan of Care Certification Period: 6/29/22  Visit # / Visits authorized: 13/ 20  DOS: 1/4/22     Time In: 0805  Time Out: 0900 am  Total Billable Time: 55 minutes     Precautions: Standard, s/p L RCR-small with biceps tenodesis  POSTOPERATIVE PLAN: We will follow the arthroscopic rotator cuff repair guidelines for a small size rotator cuff tear.  We discussed with the patient's family after surgery.  The patient will remain in a sling for 6 weeks.  PT to start at 1-2 weeks.     Cuff specific program:  Pendulum exercises and Codman's exercises in 5-7 days, protecting rotator cuff repair for 1 week by avoiding active motion program until 1 week.     PASSIVE ROM: ER side 30 degrees, Forward Flex 90 degrees, ABD - 60 degrees   Full AAROM/PROM starting at 5-7 days as tolerated        Subjective      Pt reports: Getting better    he was compliant with home exercise program given last session.   Response to previous treatment:NA  Functional change: NA    Pain: 0/10  Location: left shoulder      Objective   Passive Range of Motion: Measured in degrees: 3/22/22  Shoulder Left Right   Flexion 160 175   Abduction 150 180   ER at 90 55 90   IR 75 80        Maxwell received therapeutic exercises to develop strength, endurance, ROM and posture for 45 minutes including:  UBE 4'/4' min fwd/back R2  ABD prop w/ #4 wt x 10 min- np  Pulleys 3'/3'  "flex/scap - np  Ball on wall roll up 2 x 10 x 5" hold  Standing shoulder flex, scap and abd 3x10 ea 1#  Step outs IR/ER ytb 3x10 with 5 sec hold L- cuing shoulder blade  SL shoulder ER, flex and abd 2x10-np  Scapular rows gtb 2x15 with 5 sec hold  Shoulder ext gtb 2x15 with 5 sec hold-np  Wall slide x30 with 5 sec hold- np  ER stretch 3# x2 min  90/90 doorway stretch L 4 x 30"  CC pulldown 17 lb with stretch 3 x 10       Maxwell received the following manual therapy techniques: Joint mobilizations and Manual traction were applied to the: L shoulder for 10 minutes, including:  PROM per protocol with AP and inferior grade I/II mobilization and light distraction  Prone IR mobilization      Maxwell received ice pack for 00 min to decrease pain and swelling.         Home Exercises Provided and Patient Education Provided     Education provided:   - HEP to Go    Written Home Exercises Provided: Patient instructed to cont prior HEP.  Exercises were reviewed and Maxwell was able to demonstrate them prior to the end of the session.  Maxwell demonstrated good  understanding of the education provided.     See EMR under Patient Instructions for exercises provided     Assessment     PROM improves w/ manual GH joint mobs. Shoulder elevation strength improving. Still shows LT weakness for scap upward couple movement.   Maxwell Is progressing well towards his goals.   Pt prognosis is Good.     Pt will continue to benefit from skilled outpatient physical therapy to address the deficits listed in the problem list box on initial evaluation, provide pt/family education and to maximize pt's level of independence in the home and community environment.     Pt's spiritual, cultural and educational needs considered and pt agreeable to plan of care and goals.    Anticipated barriers to physical therapy: none    Goals:   Short Term Goals: (4 weeks)   - Pt will increase ROM to 100 deg L shoulder flex and abd (met)  - Decrease Pain to 4/10 as worst with all PT " interventions (met)  - Pt to self correct posture with minimal cues (met)  - Pt independent with HEP with progressions. (met)     Long Term Goals (24 Weeks):  - Pt will increase ROM to 180 deg L shoulder flex and abd, 90 deg ER, 80 deg IR (Progressing, not met)  - Pt will increase strength to 5/5 LUE in all planes (Progressing, not met)  - Decrease Pain to 0/10 with lifting 35# for work specific tasks (Progressing, not met)  - Pt to return to 80% PLOF (Progressing, not met)    Plan     Continue POC per pt tolerance progressing shoulder ROM and strength per protocol.    Juany Fofana, PTA

## 2022-03-29 ENCOUNTER — CLINICAL SUPPORT (OUTPATIENT)
Dept: REHABILITATION | Facility: HOSPITAL | Age: 47
End: 2022-03-29
Payer: COMMERCIAL

## 2022-03-29 DIAGNOSIS — M25.512 ACUTE PAIN OF LEFT SHOULDER: Primary | ICD-10-CM

## 2022-03-29 PROCEDURE — 97140 MANUAL THERAPY 1/> REGIONS: CPT

## 2022-03-29 PROCEDURE — 97110 THERAPEUTIC EXERCISES: CPT

## 2022-03-29 NOTE — PROGRESS NOTES
Physical Therapy Daily Treatment Note     Name: Maxwell [facility] Number: [id number]    Therapy Diagnosis:   Encounter Diagnosis   Name Primary?    Acute pain of left shoulder Yes     Physician: Karson Thakkar III, *    Visit Date: 3/29/2022    Physician Orders: PT Eval and Treat   Medical Diagnosis:   M75.20 (ICD-10-CM) - Biceps tendinitis, unspecified laterality   S46.012A (ICD-10-CM) - Traumatic tear of left rotator cuff, unspecified tear extent, initial encounter         Date of Surgery: 1/4/22  Evaluation Date: 1/12/2022  Authorization Period Expiration: 1/3/23  Plan of Care Certification Period: 6/29/22  Visit # / Visits authorized: 14/ 20  DOS: 1/4/22     Time In: 200 pm  Time Out: 300 pm  Total Billable Time: 60 minutes     Precautions: Standard, s/p L RCR-small with biceps tenodesis  POSTOPERATIVE PLAN: We will follow the arthroscopic rotator cuff repair guidelines for a small size rotator cuff tear.  We discussed with the patient's family after surgery.  The patient will remain in a sling for 6 weeks.  PT to start at 1-2 weeks.     Cuff specific program:  Pendulum exercises and Codman's exercises in 5-7 days, protecting rotator cuff repair for 1 week by avoiding active motion program until 1 week.     PASSIVE ROM: ER side 30 degrees, Forward Flex 90 degrees, ABD - 60 degrees   Full AAROM/PROM starting at 5-7 days as tolerated        Subjective      Pt reports: he is doing his ROM exercises 2-3x a day.    he was compliant with home exercise program given last session.   Response to previous treatment:NA  Functional change: NA    Pain: 0/10  Location: left shoulder      Objective   Passive Range of Motion: Measured in degrees: 3/29/22  Shoulder Left Right   Flexion 165 175   Abduction 150 180   ER at 90 60 90   IR 80 80        Maxwell received therapeutic exercises to develop strength, endurance, ROM and posture for 50 minutes including:  UBE 4'/4' min fwd/back R2  ABD prop w/ #4 wt x  "10 min- np  Pulleys 3'/3' flex/scap - np  Ball on wall roll up 3 x 10 x 5" hold  Standing shoulder flex, scap and abd 3x10 ea 2#  Step outs IR/ER ytb 3x10 with 5 sec hold L- cuing shoulder blade-np  SL shoulder ER, flex and abd 3x10 2#  Wall slide x30 with 5 sec hold- np  ER stretch 3# x2 min-np  90/90 doorway stretch L 4 x 30"-np  CC pulldown 13 lb with stretch 3 x 10         Maxwell received the following manual therapy techniques: Joint mobilizations and Manual traction were applied to the: L shoulder for 10 minutes, including:  PROM per protocol with AP and inferior grade I/II mobilization and light distraction  Prone IR mobilization  PT reassessment x5 min        Home Exercises Provided and Patient Education Provided     Education provided:   - HEP to Go    Written Home Exercises Provided: Patient instructed to cont prior HEP.  Exercises were reviewed and Maxwell was able to demonstrate them prior to the end of the session.  Maxwell demonstrated good  understanding of the education provided.     See EMR under Patient Instructions for exercises provided     Assessment     Upon reassessment, pt has improved PROM in all planes but at least 5 deg. He is still most limited with ER and had pain at end range. Pt making good progress with all scapular and shoulder strengthening.    Maxwell Is progressing well towards his goals.   Pt prognosis is Good.     Pt will continue to benefit from skilled outpatient physical therapy to address the deficits listed in the problem list box on initial evaluation, provide pt/family education and to maximize pt's level of independence in the home and community environment.     Pt's spiritual, cultural and educational needs considered and pt agreeable to plan of care and goals.    Anticipated barriers to physical therapy: none    Goals:   Short Term Goals: (4 weeks)   - Pt will increase ROM to 100 deg L shoulder flex and abd (met)  - Decrease Pain to 4/10 as worst with all PT interventions (met)  - Pt " to self correct posture with minimal cues (met)  - Pt independent with HEP with progressions. (met)     Long Term Goals (24 Weeks):  - Pt will increase ROM to 180 deg L shoulder flex and abd, 90 deg ER, 80 deg IR (Progressing, not met)  - Pt will increase strength to 5/5 LUE in all planes (Progressing, not met)  - Decrease Pain to 0/10 with lifting 35# for work specific tasks (Progressing, not met)  - Pt to return to 80% PLOF (Progressing, not met)    Plan     Continue POC per pt tolerance progressing shoulder ROM and strength per protocol.    Monika Fuchs, PT

## 2022-04-01 ENCOUNTER — OFFICE VISIT (OUTPATIENT)
Dept: SPORTS MEDICINE | Facility: CLINIC | Age: 47
End: 2022-04-01
Payer: COMMERCIAL

## 2022-04-01 ENCOUNTER — CLINICAL SUPPORT (OUTPATIENT)
Dept: REHABILITATION | Facility: HOSPITAL | Age: 47
End: 2022-04-01
Payer: COMMERCIAL

## 2022-04-01 VITALS
SYSTOLIC BLOOD PRESSURE: 140 MMHG | HEIGHT: 73 IN | DIASTOLIC BLOOD PRESSURE: 93 MMHG | BODY MASS INDEX: 30.09 KG/M2 | WEIGHT: 227 LBS | HEART RATE: 76 BPM

## 2022-04-01 DIAGNOSIS — M25.512 ACUTE PAIN OF LEFT SHOULDER: Primary | ICD-10-CM

## 2022-04-01 DIAGNOSIS — Z98.890 S/P ROTATOR CUFF SURGERY: Primary | ICD-10-CM

## 2022-04-01 PROCEDURE — 97140 MANUAL THERAPY 1/> REGIONS: CPT | Mod: CQ

## 2022-04-01 PROCEDURE — 99024 PR POST-OP FOLLOW-UP VISIT: ICD-10-PCS | Mod: S$GLB,,, | Performed by: ORTHOPAEDIC SURGERY

## 2022-04-01 PROCEDURE — 3008F BODY MASS INDEX DOCD: CPT | Mod: CPTII,S$GLB,, | Performed by: ORTHOPAEDIC SURGERY

## 2022-04-01 PROCEDURE — 1159F PR MEDICATION LIST DOCUMENTED IN MEDICAL RECORD: ICD-10-PCS | Mod: CPTII,S$GLB,, | Performed by: ORTHOPAEDIC SURGERY

## 2022-04-01 PROCEDURE — 99999 PR PBB SHADOW E&M-EST. PATIENT-LVL III: ICD-10-PCS | Mod: PBBFAC,,, | Performed by: ORTHOPAEDIC SURGERY

## 2022-04-01 PROCEDURE — 99024 POSTOP FOLLOW-UP VISIT: CPT | Mod: S$GLB,,, | Performed by: ORTHOPAEDIC SURGERY

## 2022-04-01 PROCEDURE — 3080F DIAST BP >= 90 MM HG: CPT | Mod: CPTII,S$GLB,, | Performed by: ORTHOPAEDIC SURGERY

## 2022-04-01 PROCEDURE — 3077F PR MOST RECENT SYSTOLIC BLOOD PRESSURE >= 140 MM HG: ICD-10-PCS | Mod: CPTII,S$GLB,, | Performed by: ORTHOPAEDIC SURGERY

## 2022-04-01 PROCEDURE — 99999 PR PBB SHADOW E&M-EST. PATIENT-LVL III: CPT | Mod: PBBFAC,,, | Performed by: ORTHOPAEDIC SURGERY

## 2022-04-01 PROCEDURE — 3008F PR BODY MASS INDEX (BMI) DOCUMENTED: ICD-10-PCS | Mod: CPTII,S$GLB,, | Performed by: ORTHOPAEDIC SURGERY

## 2022-04-01 PROCEDURE — 1159F MED LIST DOCD IN RCRD: CPT | Mod: CPTII,S$GLB,, | Performed by: ORTHOPAEDIC SURGERY

## 2022-04-01 PROCEDURE — 97110 THERAPEUTIC EXERCISES: CPT | Mod: CQ

## 2022-04-01 PROCEDURE — 1160F PR REVIEW ALL MEDS BY PRESCRIBER/CLIN PHARMACIST DOCUMENTED: ICD-10-PCS | Mod: CPTII,S$GLB,, | Performed by: ORTHOPAEDIC SURGERY

## 2022-04-01 PROCEDURE — 1160F RVW MEDS BY RX/DR IN RCRD: CPT | Mod: CPTII,S$GLB,, | Performed by: ORTHOPAEDIC SURGERY

## 2022-04-01 PROCEDURE — 3080F PR MOST RECENT DIASTOLIC BLOOD PRESSURE >= 90 MM HG: ICD-10-PCS | Mod: CPTII,S$GLB,, | Performed by: ORTHOPAEDIC SURGERY

## 2022-04-01 PROCEDURE — 3077F SYST BP >= 140 MM HG: CPT | Mod: CPTII,S$GLB,, | Performed by: ORTHOPAEDIC SURGERY

## 2022-04-01 NOTE — PROGRESS NOTES
Physical Therapy Daily Treatment Note     Name: Maxwell [facility] Number: [id number]    Therapy Diagnosis:   Encounter Diagnosis   Name Primary?    Acute pain of left shoulder Yes     Physician: Karson Thakkar III, *    Visit Date: 4/1/2022    Physician Orders: PT Eval and Treat   Medical Diagnosis:   M75.20 (ICD-10-CM) - Biceps tendinitis, unspecified laterality   S46.012A (ICD-10-CM) - Traumatic tear of left rotator cuff, unspecified tear extent, initial encounter         Date of Surgery: 1/4/22  Evaluation Date: 1/12/2022  Authorization Period Expiration: 1/3/23  Plan of Care Certification Period: 6/29/22  Visit # / Visits authorized: 15/ 20  DOS: 1/4/22     Time In: 0700 am  Time Out: 0800 am  Total Billable Time: 60 minutes     Precautions: Standard, s/p L RCR-small with biceps tenodesis  POSTOPERATIVE PLAN: We will follow the arthroscopic rotator cuff repair guidelines for a small size rotator cuff tear.  We discussed with the patient's family after surgery.  The patient will remain in a sling for 6 weeks.  PT to start at 1-2 weeks.     Cuff specific program:  Pendulum exercises and Codman's exercises in 5-7 days, protecting rotator cuff repair for 1 week by avoiding active motion program until 1 week.     PASSIVE ROM: ER side 30 degrees, Forward Flex 90 degrees, ABD - 60 degrees   Full AAROM/PROM starting at 5-7 days as tolerated        Subjective      Pt reports: Shoulder is doing good   he was compliant with home exercise program given last session.   Response to previous treatment:NA  Functional change: NA    Pain: 0/10  Location: left shoulder      Objective   Passive Range of Motion: Measured in degrees: 3/29/22  Shoulder Left Right   Flexion 165 175   Abduction 150 180   ER at 90 90 90   IR 80 80        Maxwell received therapeutic exercises to develop strength, endurance, ROM and posture for 50 minutes including:  UBE 4'/4' min fwd/back R2  Pulleys 3'/3' flex/scap - np  Ball on  "wall roll up 3 x 10 x 5" hold  Standing shoulder flex, scap and abd 3x10 ea 2#  Step outs IR/ER ytb 3x10 with 5 sec hold L- cuing shoulder blade-np  SL shoulder ER, flex and abd 3x10 2#  ER stretch 3# x2 min  CC pulldown 13 lb with stretch 3 x 10   Ball on wall 4 way 30x      Maxwell received the following manual therapy techniques: Joint mobilizations and Manual traction were applied to the: L shoulder for 10 minutes, including:  PROM per protocol with AP and inferior grade I/II mobilization and light distraction  Prone IR mobilization-np          Home Exercises Provided and Patient Education Provided     Education provided:   - HEP to Go    Written Home Exercises Provided: Patient instructed to cont prior HEP.  Exercises were reviewed and Maxwell was able to demonstrate them prior to the end of the session.  Maxwell demonstrated good  understanding of the education provided.     See EMR under Patient Instructions for exercises provided     Assessment     ROM is nearing full range. Continues to be restricted w/ FL and IR. Full ER achieved today. Progressing w/ shoulder stability and strength     Maxwell Is progressing well towards his goals.   Pt prognosis is Good.     Pt will continue to benefit from skilled outpatient physical therapy to address the deficits listed in the problem list box on initial evaluation, provide pt/family education and to maximize pt's level of independence in the home and community environment.     Pt's spiritual, cultural and educational needs considered and pt agreeable to plan of care and goals.    Anticipated barriers to physical therapy: none    Goals:   Short Term Goals: (4 weeks)   - Pt will increase ROM to 100 deg L shoulder flex and abd (met)  - Decrease Pain to 4/10 as worst with all PT interventions (met)  - Pt to self correct posture with minimal cues (met)  - Pt independent with HEP with progressions. (met)     Long Term Goals (24 Weeks):  - Pt will increase ROM to 180 deg L shoulder flex " and abd, 90 deg ER, 80 deg IR (Progressing, not met)  - Pt will increase strength to 5/5 LUE in all planes (Progressing, not met)  - Decrease Pain to 0/10 with lifting 35# for work specific tasks (Progressing, not met)  - Pt to return to 80% PLOF (Progressing, not met)    Plan     Continue POC per pt tolerance progressing shoulder ROM and strength per protocol.    Juany Fofana, PTA

## 2022-04-01 NOTE — PROGRESS NOTES
CC left shoulder pain    HISTORY OF PRESENT ILLNESS:   Pt is here today for post-operative followup of his shoulder arthroscopy.  he is doing well.  We have reviewed his findings and discussed plan of care and future treatment options.                         Patient has been attending physical therapy at the [facility] location, working with Monika CARBALLO.    Patient notes he is doing well and has no issues or concerns               SANE 4/1/2022: 90%  SANE post op: 30  SANE pre op: 35        DATE OF PROCEDURE: 01/04/2022  SURGEON: Mraly Heart M.D.  OPERATION:   left  1. Shoulder arthroscopic rotator cuff repair (upper 1/8 subscapularis) with Cuff Mend ()  2. Shoulder arthroscopic biceps tenodesis (CPT 18012)  3. Shoulder arthroscopic subacromial decompression, bursectomy   4. Shoulder arthroscopic extensive debridement (anterior, posterior glenohumeral joint, subacromial space) (CPT 05711)  5. Shoulder arthroscopic labral debridement (CPT 54089)  6. Shoulder arthroscopic lysis of adhesions (CPT 20965)  7. Shoulder arthroscopic loose body removal    There was chondral damage to:  Humeral head: 20 x 15 mm grade 3  Glenoid: 5 x 15 mm grade 3  Chondroplasty was performed using arthroscopic shaver.                                              PHYSICAL EXAMINATION:     Incision sites healed well  No evidence of any erythema, infection or induration  elbow Range of motion full   No effusion  2+ DP pulse  No swelling       Forward Flexion: 175  ER: 55  IR: T10    Strength at 0 and 30: 5/5  ER: 5/5                                                                            ASSESSMENT:                                                                                                                                               1. Status post above, doing well.                                                                                                                               PLAN:                                                                                                                                                      1. Continue PT  2. Emphasized scapular function.  3. I have discussed return to activity in detail.  4. he will see us back in 12 weeks.                                      5. All questions were answered and he should contact us if he  has any questions or concerns in the interim.

## 2022-04-06 ENCOUNTER — CLINICAL SUPPORT (OUTPATIENT)
Dept: REHABILITATION | Facility: HOSPITAL | Age: 47
End: 2022-04-06
Payer: COMMERCIAL

## 2022-04-06 DIAGNOSIS — M25.512 ACUTE PAIN OF LEFT SHOULDER: Primary | ICD-10-CM

## 2022-04-06 PROCEDURE — 97140 MANUAL THERAPY 1/> REGIONS: CPT | Mod: CQ

## 2022-04-06 PROCEDURE — 97110 THERAPEUTIC EXERCISES: CPT | Mod: CQ

## 2022-04-06 NOTE — PROGRESS NOTES
Physical Therapy Daily Treatment Note     Name: Maxwell [facility] Number: [id number]    Therapy Diagnosis:   Encounter Diagnosis   Name Primary?    Acute pain of left shoulder Yes     Physician: Karson Thakkar III, *    Visit Date: 4/6/2022    Physician Orders: PT Eval and Treat   Medical Diagnosis:   M75.20 (ICD-10-CM) - Biceps tendinitis, unspecified laterality   S46.012A (ICD-10-CM) - Traumatic tear of left rotator cuff, unspecified tear extent, initial encounter         Date of Surgery: 1/4/22  Evaluation Date: 1/12/2022  Authorization Period Expiration: 1/3/23  Plan of Care Certification Period: 6/29/22  Visit # / Visits authorized: 16/ 20  DOS: 1/4/22     Time In: 2:55 pm  Time Out: 4:00 pm  Total Billable Time: 65 minutes     Precautions: Standard, s/p L RCR-small with biceps tenodesis  POSTOPERATIVE PLAN: We will follow the arthroscopic rotator cuff repair guidelines for a small size rotator cuff tear.  We discussed with the patient's family after surgery.  The patient will remain in a sling for 6 weeks.  PT to start at 1-2 weeks.     Cuff specific program:  Pendulum exercises and Codman's exercises in 5-7 days, protecting rotator cuff repair for 1 week by avoiding active motion program until 1 week.     PASSIVE ROM: ER side 30 degrees, Forward Flex 90 degrees, ABD - 60 degrees   Full AAROM/PROM starting at 5-7 days as tolerated        Subjective      Pt reports: Shoulder is good   he was compliant with home exercise program given last session.   Response to previous treatment:NA  Functional change: NA    Pain: 0/10  Location: left shoulder      Objective   Passive Range of Motion: Measured in degrees: 3/29/22  Shoulder Left Right   Flexion 165 175   Abduction 150 180   ER at 90 90 90   IR 80 80        Maxwell received therapeutic exercises to develop strength, endurance, ROM and posture for 50 minutes including:  UBE 4'/4' min fwd/back R2  Pulleys 3'/3' flex/scap - np  Ball on wall  "roll up 3 x 10 x 5" hold  Standing shoulder flex, scap and abd 3x10 ea 2#  AROM IR/ER otb 3x10 with 5 sec hold L-  SL shoulder ER, flex and abd 3x10 2#  ER stretch 3# x2 min  CC pulldown 13 lb with stretch 3 x 10   Ball on wall 4 way 30x      Maxwell received the following manual therapy techniques: Joint mobilizations and Manual traction were applied to the: L shoulder for 10 minutes, including:  PROM per protocol with AP and inferior grade I/II mobilization and light distraction  Prone IR mobilization-np          Home Exercises Provided and Patient Education Provided     Education provided:   - HEP to Go    Written Home Exercises Provided: Patient instructed to cont prior HEP.  Exercises were reviewed and Maxwell was able to demonstrate them prior to the end of the session.  Maxwell demonstrated good  understanding of the education provided.     See EMR under Patient Instructions for exercises provided     Assessment     Maxwell yumiko session well w/o adverse reaction.Cont to progress w/ PROM.   Maxwell Is progressing well towards his goals.   Pt prognosis is Good.     Pt will continue to benefit from skilled outpatient physical therapy to address the deficits listed in the problem list box on initial evaluation, provide pt/family education and to maximize pt's level of independence in the home and community environment.     Pt's spiritual, cultural and educational needs considered and pt agreeable to plan of care and goals.    Anticipated barriers to physical therapy: none    Goals:   Short Term Goals: (4 weeks)   - Pt will increase ROM to 100 deg L shoulder flex and abd (met)  - Decrease Pain to 4/10 as worst with all PT interventions (met)  - Pt to self correct posture with minimal cues (met)  - Pt independent with HEP with progressions. (met)     Long Term Goals (24 Weeks):  - Pt will increase ROM to 180 deg L shoulder flex and abd, 90 deg ER, 80 deg IR (Progressing, not met)  - Pt will increase strength to 5/5 LUE in all planes " (Progressing, not met)  - Decrease Pain to 0/10 with lifting 35# for work specific tasks (Progressing, not met)  - Pt to return to 80% PLOF (Progressing, not met)    Plan     Continue POC per pt tolerance progressing shoulder ROM and strength per protocol.    Juany Fofana, PTA

## 2022-04-08 ENCOUNTER — CLINICAL SUPPORT (OUTPATIENT)
Dept: REHABILITATION | Facility: HOSPITAL | Age: 47
End: 2022-04-08
Payer: COMMERCIAL

## 2022-04-08 DIAGNOSIS — M25.512 ACUTE PAIN OF LEFT SHOULDER: Primary | ICD-10-CM

## 2022-04-08 PROCEDURE — 97110 THERAPEUTIC EXERCISES: CPT | Mod: CQ

## 2022-04-08 PROCEDURE — 97140 MANUAL THERAPY 1/> REGIONS: CPT | Mod: CQ

## 2022-04-08 NOTE — PROGRESS NOTES
"                          Physical Therapy Daily Treatment Note     Name: Maxwell [facility] Number: [id number]    Therapy Diagnosis:   Encounter Diagnosis   Name Primary?    Acute pain of left shoulder Yes     Physician: Karson Thakkar III, *    Visit Date: 4/8/2022    Physician Orders: PT Eval and Treat   Medical Diagnosis:   M75.20 (ICD-10-CM) - Biceps tendinitis, unspecified laterality   S46.012A (ICD-10-CM) - Traumatic tear of left rotator cuff, unspecified tear extent, initial encounter         Date of Surgery: 1/4/22  Evaluation Date: 1/12/2022  Authorization Period Expiration: 1/3/23  Plan of Care Certification Period: 6/29/22  Visit # / Visits authorized: 17/ 20  DOS: 1/4/22     Time In: 7:00 am  Time Out: 8:00 am  Total Billable Time: 60 minutes     Precautions: Standard, s/p L RCR-small with biceps tenodesis  POSTOPERATIVE PLAN: We will follow the arthroscopic rotator cuff repair guidelines for a small size rotator cuff tear.  We discussed with the patient's family after surgery.  The patient will remain in a sling for 6 weeks.  PT to start at 1-2 weeks.     Cuff specific program:  Pendulum exercises and Codman's exercises in 5-7 days, protecting rotator cuff repair for 1 week by avoiding active motion program until 1 week.     PASSIVE ROM: ER side 30 degrees, Forward Flex 90 degrees, ABD - 60 degrees   Full AAROM/PROM starting at 5-7 days as tolerated        Subjective      Pt reports: Shoulder is good   he was compliant with home exercise program given last session.   Response to previous treatment:NA  Functional change: NA    Pain: 0/10  Location: left shoulder      Objective   Passive Range of Motion: Measured in degrees: 3/29/22  Shoulder Left Right   Flexion 165 175   Abduction 150 180   ER at 90 90 90   IR 80 80        Maxwell received therapeutic exercises to develop strength, endurance, ROM and posture for 50 minutes including:  UBE 4'/4' min fwd/back R2  Ball on wall roll up 3 x 10 x 5" " "hold  Standing shoulder flex, scap and abd 3x10 ea 2#  AROM IR/ER otb 3x10 with 5 sec hold L-  SL shoulder ER, flex and abd 3x10 2#  ER stretch 3# x2 min-NP  CC pulldown 13 lb with stretch 3 x 10 -NP  Ball on wall 4 way 30x red weighted ball  LT iso w/ lift off 5" hold 20x  SA punch w/ FL GTB 3x10    Maxwell received the following manual therapy techniques: Joint mobilizations and Manual traction were applied to the: L shoulder for 10 minutes, including:  PROM per protocol with AP and inferior grade I/II mobilization and light distraction  Prone IR mobilization-np          Home Exercises Provided and Patient Education Provided     Education provided:   - HEP to Go    Written Home Exercises Provided: Patient instructed to cont prior HEP.  Exercises were reviewed and Maxwell was able to demonstrate them prior to the end of the session.  Maxwell demonstrated good  understanding of the education provided.     See EMR under Patient Instructions for exercises provided     Assessment     Maxwell is nearing full ROM w/ PROM FL and ER. Still limited IR but as expected. Worked on LT and SA activation today to improve scap upward rot coupling.   Maxwell Is progressing well towards his goals.   Pt prognosis is Good.     Pt will continue to benefit from skilled outpatient physical therapy to address the deficits listed in the problem list box on initial evaluation, provide pt/family education and to maximize pt's level of independence in the home and community environment.     Pt's spiritual, cultural and educational needs considered and pt agreeable to plan of care and goals.    Anticipated barriers to physical therapy: none    Goals:   Short Term Goals: (4 weeks)   - Pt will increase ROM to 100 deg L shoulder flex and abd (met)  - Decrease Pain to 4/10 as worst with all PT interventions (met)  - Pt to self correct posture with minimal cues (met)  - Pt independent with HEP with progressions. (met)     Long Term Goals (24 Weeks):  - Pt will " increase ROM to 180 deg L shoulder flex and abd, 90 deg ER, 80 deg IR (Progressing, not met)  - Pt will increase strength to 5/5 LUE in all planes (Progressing, not met)  - Decrease Pain to 0/10 with lifting 35# for work specific tasks (Progressing, not met)  - Pt to return to 80% PLOF (Progressing, not met)    Plan     Continue POC per pt tolerance progressing shoulder ROM and strength per protocol.    Juany Fofana, PTA

## 2022-04-12 ENCOUNTER — CLINICAL SUPPORT (OUTPATIENT)
Dept: REHABILITATION | Facility: HOSPITAL | Age: 47
End: 2022-04-12
Payer: COMMERCIAL

## 2022-04-12 DIAGNOSIS — M25.512 ACUTE PAIN OF LEFT SHOULDER: Primary | ICD-10-CM

## 2022-04-12 PROCEDURE — 97110 THERAPEUTIC EXERCISES: CPT

## 2022-04-12 PROCEDURE — 97112 NEUROMUSCULAR REEDUCATION: CPT

## 2022-04-12 NOTE — PROGRESS NOTES
Physical Therapy Daily Treatment Note     Name: [facility] Number: [id number]    Therapy Diagnosis:   Encounter Diagnosis   Name Primary?    Acute pain of left shoulder Yes     Physician: Karson Thakkar III, *    Visit Date: 4/12/2022    Physician Orders: PT Eval and Treat   Medical Diagnosis:   M75.20 (ICD-10-CM) - Biceps tendinitis, unspecified laterality   S46.012A (ICD-10-CM) - Traumatic tear of left rotator cuff, unspecified tear extent, initial encounter         Date of Surgery: 1/4/22  Evaluation Date: 1/12/2022  Authorization Period Expiration: 1/3/23  Plan of Care Certification Period: 6/29/22  Visit # / Visits authorized: 18/ 20  DOS: 1/4/22     Time In: 200 pm  Time Out: 300 pm  Total Billable Time: 60 minutes     Precautions: Standard, s/p L RCR-small with biceps tenodesis  POSTOPERATIVE PLAN: We will follow the arthroscopic rotator cuff repair guidelines for a small size rotator cuff tear.  We discussed with the patient's family after surgery.  The patient will remain in a sling for 6 weeks.  PT to start at 1-2 weeks.     Cuff specific program:  Pendulum exercises and Codman's exercises in 5-7 days, protecting rotator cuff repair for 1 week by avoiding active motion program until 1 week.     PASSIVE ROM: ER side 30 degrees, Forward Flex 90 degrees, ABD - 60 degrees   Full AAROM/PROM starting at 5-7 days as tolerated        Subjective      Pt reports: had a good follow up with MD.    he was compliant with home exercise program given last session.   Response to previous treatment:NA  Functional change: NA    Pain: 0/10  Location: left shoulder      Objective   Passive Range of Motion: Measured in degrees: 3/29/22  Shoulder Left Right   Flexion 165 175   Abduction 150 180   ER at 90 90 90   IR 80 80        Maxwell received therapeutic exercises to develop strength, endurance, ROM and posture for 45 minutes including:  UBE 5'/5' min fwd/back R4.5  Ball on wall roll up 3 x 10 x  "5" hold  Standing shoulder flex, scap and abd 3x10 ea 3#  AROM IR/ER gtb 3x10 with 5 sec hold L  SL shoulder ER, flex and abd 3x10 2#-np  ER stretch 3# x2 min-NP  CC pulldown 13 lb with stretch 3 x 10  LT iso w/ lift off 5" hold 20x-np  SA punch w/ FL GTB 3x10-np  Prayer stretch 3x 10 min        Maxwell participated in neuromuscular re-education activities to improve: Coordination, Proprioception and Posture for 15 minutes. The following activities were included:  SA on foam roller ytb 2x10 with 5 sec hold  Ball on wall 4 way 30x red weighted ball  SA shoulder taps leaning over EOT x30    Maxwell received the following manual therapy techniques: Joint mobilizations and Manual traction were applied to the: L shoulder for 00 minutes, including:  PROM per protocol with AP and inferior grade I/II mobilization and light distraction  Prone IR mobilization-np          Home Exercises Provided and Patient Education Provided     Education provided:   - HEP to Go    Written Home Exercises Provided: Patient instructed to cont prior HEP.  Exercises were reviewed and Maxwell was able to demonstrate them prior to the end of the session.  Maxwell demonstrated good  understanding of the education provided.     See EMR under Patient Instructions for exercises provided     Assessment     Pt tolerated more SA activation well and was able to increase resistance well for shoulder strengthening. Plan to progress as tolerated with more BUE WB interventions.   Maxwell Is progressing well towards his goals.   Pt prognosis is Good.     Pt will continue to benefit from skilled outpatient physical therapy to address the deficits listed in the problem list box on initial evaluation, provide pt/family education and to maximize pt's level of independence in the home and community environment.     Pt's spiritual, cultural and educational needs considered and pt agreeable to plan of care and goals.    Anticipated barriers to physical therapy: none    Goals:   Short " Term Goals: (4 weeks)   - Pt will increase ROM to 100 deg L shoulder flex and abd (met)  - Decrease Pain to 4/10 as worst with all PT interventions (met)  - Pt to self correct posture with minimal cues (met)  - Pt independent with HEP with progressions. (met)     Long Term Goals (24 Weeks):  - Pt will increase ROM to 180 deg L shoulder flex and abd, 90 deg ER, 80 deg IR (Progressing, not met)  - Pt will increase strength to 5/5 LUE in all planes (Progressing, not met)  - Decrease Pain to 0/10 with lifting 35# for work specific tasks (Progressing, not met)  - Pt to return to 80% PLOF (Progressing, not met)    Plan     Continue POC per pt tolerance progressing shoulder ROM and strength per protocol.    Monika Fuchs, PT

## 2022-04-19 ENCOUNTER — CLINICAL SUPPORT (OUTPATIENT)
Dept: REHABILITATION | Facility: HOSPITAL | Age: 47
End: 2022-04-19
Payer: COMMERCIAL

## 2022-04-19 DIAGNOSIS — M25.512 ACUTE PAIN OF LEFT SHOULDER: Primary | ICD-10-CM

## 2022-04-19 PROCEDURE — 97112 NEUROMUSCULAR REEDUCATION: CPT

## 2022-04-19 PROCEDURE — 97110 THERAPEUTIC EXERCISES: CPT

## 2022-04-19 NOTE — PROGRESS NOTES
Physical Therapy Daily Treatment Note     Name: [facility] Number: [id number]    Therapy Diagnosis:   Encounter Diagnosis   Name Primary?    Acute pain of left shoulder Yes     Physician: Karson Thakkar III, *    Visit Date: 4/19/2022    Physician Orders: PT Eval and Treat   Medical Diagnosis:   M75.20 (ICD-10-CM) - Biceps tendinitis, unspecified laterality   S46.012A (ICD-10-CM) - Traumatic tear of left rotator cuff, unspecified tear extent, initial encounter         Date of Surgery: 1/4/22  Evaluation Date: 1/12/2022  Authorization Period Expiration: 1/3/23  Plan of Care Certification Period: 6/29/22  Visit # / Visits authorized: 19/ 20  DOS: 1/4/22     Time In: 315 pm  Time Out: 400 pm  Total Billable Time: 45 minutes     Precautions: Standard, s/p L RCR-small with biceps tenodesis  POSTOPERATIVE PLAN: We will follow the arthroscopic rotator cuff repair guidelines for a small size rotator cuff tear.  We discussed with the patient's family after surgery.  The patient will remain in a sling for 6 weeks.  PT to start at 1-2 weeks.     Cuff specific program:  Pendulum exercises and Codman's exercises in 5-7 days, protecting rotator cuff repair for 1 week by avoiding active motion program until 1 week.     PASSIVE ROM: ER side 30 degrees, Forward Flex 90 degrees, ABD - 60 degrees   Full AAROM/PROM starting at 5-7 days as tolerated        Subjective      Pt reports: running late secondary to sera closure/traffic.    he was compliant with home exercise program given last session.   Response to previous treatment:NA  Functional change: NA    Pain: 0/10  Location: left shoulder      Objective   Passive Range of Motion: Measured in degrees: 3/29/22  Shoulder Left Right   Flexion 165 175   Abduction 150 180   ER at 90 90 90   IR 80 80        Maxwell received therapeutic exercises to develop strength, endurance, ROM and posture for 35 minutes including:  UBE 5'/5' min fwd/back R4.5  Prone pillow  "w's and ext 3x10 with 5 sec hold  Standing shoulder flex, scap and abd 3x10 ea 3#  AROM IR/ER gtb 3x10 with 5 sec hold L-np  SL shoulder ER, flex and abd 3x10 2#-np  ER stretch 3# x2 min-NP  CC pulldown 13 lb with stretch 3 x 10-np  LT iso w/ lift off 5" hold 20x-np  SA punch w/ FL GTB 3x10-np  Waiters step out otb 2x10 with 5 sec hold L  Prayer stretch 3x 10 min-np      Maxwell participated in neuromuscular re-education activities to improve: Coordination, Proprioception and Posture for 10 minutes. The following activities were included:  SA on foam roller ytb 2x10 with 5 sec hold-np  Ball on wall 4 way 30x red weighted ball-  Cheerleaders ytb 3x10  SA shoulder taps leaning over EOT x30-np          Home Exercises Provided and Patient Education Provided     Education provided:   - HEP to Go    Written Home Exercises Provided: Patient instructed to cont prior HEP.  Exercises were reviewed and Maxwell was able to demonstrate them prior to the end of the session.  Maxwell demonstrated good  understanding of the education provided.     See EMR under Patient Instructions for exercises provided     Assessment     Pt progressed well with prone interventions to progress posterior shoulder strengthening . Plan to progress as tolerated with more BUE WB interventions.   Maxwell Is progressing well towards his goals.   Pt prognosis is Good.     Pt will continue to benefit from skilled outpatient physical therapy to address the deficits listed in the problem list box on initial evaluation, provide pt/family education and to maximize pt's level of independence in the home and community environment.     Pt's spiritual, cultural and educational needs considered and pt agreeable to plan of care and goals.    Anticipated barriers to physical therapy: none    Goals:   Short Term Goals: (4 weeks)   - Pt will increase ROM to 100 deg L shoulder flex and abd (met)  - Decrease Pain to 4/10 as worst with all PT interventions (met)  - Pt to self correct " posture with minimal cues (met)  - Pt independent with HEP with progressions. (met)     Long Term Goals (24 Weeks):  - Pt will increase ROM to 180 deg L shoulder flex and abd, 90 deg ER, 80 deg IR (Progressing, not met)  - Pt will increase strength to 5/5 LUE in all planes (Progressing, not met)  - Decrease Pain to 0/10 with lifting 35# for work specific tasks (Progressing, not met)  - Pt to return to 80% PLOF (Progressing, not met)    Plan     Continue POC per pt tolerance progressing shoulder ROM and strength per protocol.    Monika Fuchs, PT

## 2022-04-21 ENCOUNTER — PATIENT MESSAGE (OUTPATIENT)
Dept: REHABILITATION | Facility: HOSPITAL | Age: 47
End: 2022-04-21
Payer: COMMERCIAL

## 2022-04-26 ENCOUNTER — CLINICAL SUPPORT (OUTPATIENT)
Dept: REHABILITATION | Facility: HOSPITAL | Age: 47
End: 2022-04-26
Payer: COMMERCIAL

## 2022-04-26 DIAGNOSIS — M25.512 ACUTE PAIN OF LEFT SHOULDER: Primary | ICD-10-CM

## 2022-04-26 PROCEDURE — 97110 THERAPEUTIC EXERCISES: CPT

## 2022-04-26 PROCEDURE — 97112 NEUROMUSCULAR REEDUCATION: CPT

## 2022-04-26 NOTE — PROGRESS NOTES
Physical Therapy Daily Treatment Note     Name: [facility] Number: [id number]    Therapy Diagnosis:   Encounter Diagnosis   Name Primary?    Acute pain of left shoulder Yes     Physician: Karson Thakkar III, *    Visit Date: 4/26/2022    Physician Orders: PT Eval and Treat   Medical Diagnosis:   M75.20 (ICD-10-CM) - Biceps tendinitis, unspecified laterality   S46.012A (ICD-10-CM) - Traumatic tear of left rotator cuff, unspecified tear extent, initial encounter         Date of Surgery: 1/4/22  Evaluation Date: 1/12/2022  Authorization Period Expiration: 1/3/23  Plan of Care Certification Period: 6/29/22  Visit # / Visits authorized: 20/ 20  DOS: 1/4/22     Time In: 300 pm  Time Out: 355 pm  Total Billable Time: 55 minutes     Precautions: Standard, s/p L RCR-small with biceps tenodesis  POSTOPERATIVE PLAN: We will follow the arthroscopic rotator cuff repair guidelines for a small size rotator cuff tear.  We discussed with the patient's family after surgery.  The patient will remain in a sling for 6 weeks.  PT to start at 1-2 weeks.     Cuff specific program:  Pendulum exercises and Codman's exercises in 5-7 days, protecting rotator cuff repair for 1 week by avoiding active motion program until 1 week.     PASSIVE ROM: ER side 30 degrees, Forward Flex 90 degrees, ABD - 60 degrees   Full AAROM/PROM starting at 5-7 days as tolerated        Subjective      Pt reports: feeling good.   he was compliant with home exercise program given last session.   Response to previous treatment:NA  Functional change: NA    Pain: 0/10  Location: left shoulder      Objective   Passive Range of Motion: Measured in degrees: 3/29/22  Shoulder Left Right   Flexion 165 175   Abduction 150 180   ER at 90 90 90   IR 80 80        Maxwell received therapeutic exercises to develop strength, endurance, ROM and posture for 25 minutes including:  UBE 5'/5' min fwd/back R4.5  Prone pillow w's and ext 3x10 with 5 sec hold  "1#  Standing shoulder flex, scap and abd 3x10 ea 3#-np  AROM IR/ER gtb 3x10 with 5 sec hold L-np  CC pulldown 13 lb with stretch 3 x 10-np  LT iso w/ lift off 5" hold 20x-np  SA punch w/ FL GTB 3x10-np  Waiters step out otb 2x15 with 5 sec hold L  Prayer stretch 3x 10 min-np      Maxwell participated in neuromuscular re-education activities to improve: Coordination, Proprioception and Posture for 30 minutes. The following activities were included:  SA on foam roller 2x10 with arm lift B  Ball on wall 4 way 30x red weighted ball  Cheerleaders ytb 3x10-np  SA shoulder taps leaning over EOT x30-np  Modified side plank 10 sec x10 B  CC Lifts (7#)/chops (10#) 2x10 B          Home Exercises Provided and Patient Education Provided     Education provided:   - HEP to Go    Written Home Exercises Provided: Patient instructed to cont prior HEP.  Exercises were reviewed and Maxwell was able to demonstrate them prior to the end of the session.  Maxwell demonstrated good  understanding of the education provided.     See EMR under Patient Instructions for exercises provided     Assessment     Pt tolerated PNF pattern and modified planking well for scapular stability and functional strengthening. He continues to progress well and was encouraged by me to start swimming to improve endurance.   Maxwell Is progressing well towards his goals.   Pt prognosis is Good.     Pt will continue to benefit from skilled outpatient physical therapy to address the deficits listed in the problem list box on initial evaluation, provide pt/family education and to maximize pt's level of independence in the home and community environment.     Pt's spiritual, cultural and educational needs considered and pt agreeable to plan of care and goals.    Anticipated barriers to physical therapy: none    Goals:   Short Term Goals: (4 weeks)   - Pt will increase ROM to 100 deg L shoulder flex and abd (met)  - Decrease Pain to 4/10 as worst with all PT interventions (met)  - Pt to " self correct posture with minimal cues (met)  - Pt independent with HEP with progressions. (met)     Long Term Goals (24 Weeks):  - Pt will increase ROM to 180 deg L shoulder flex and abd, 90 deg ER, 80 deg IR (Progressing, not met)  - Pt will increase strength to 5/5 LUE in all planes (Progressing, not met)  - Decrease Pain to 0/10 with lifting 35# for work specific tasks (Progressing, not met)  - Pt to return to 80% PLOF (Progressing, not met)    Plan     Continue POC per pt tolerance progressing shoulder ROM and strength per protocol.    Monika Fuchs, PT

## 2022-04-29 ENCOUNTER — CLINICAL SUPPORT (OUTPATIENT)
Dept: REHABILITATION | Facility: HOSPITAL | Age: 47
End: 2022-04-29
Payer: COMMERCIAL

## 2022-04-29 DIAGNOSIS — M25.512 ACUTE PAIN OF LEFT SHOULDER: Primary | ICD-10-CM

## 2022-04-29 PROCEDURE — 97112 NEUROMUSCULAR REEDUCATION: CPT | Mod: CQ

## 2022-04-29 PROCEDURE — 97110 THERAPEUTIC EXERCISES: CPT | Mod: CQ

## 2022-04-29 NOTE — PROGRESS NOTES
Physical Therapy Daily Treatment Note     Name: Maxwell [facility] Number: [id number]    Therapy Diagnosis:   Encounter Diagnosis   Name Primary?    Acute pain of left shoulder Yes     Physician: Karson Thakkar III, *    Visit Date: 4/29/2022    Physician Orders: PT Eval and Treat   Medical Diagnosis:   M75.20 (ICD-10-CM) - Biceps tendinitis, unspecified laterality   S46.012A (ICD-10-CM) - Traumatic tear of left rotator cuff, unspecified tear extent, initial encounter         Date of Surgery: 1/4/22  Evaluation Date: 1/12/2022  Authorization Period Expiration: 1/3/23  Plan of Care Certification Period: 6/29/22  Visit # / Visits authorized: 21/ 20  DOS: 1/4/22     Time In: 700 am  Time Out: 0800 am  Total Billable Time: 60 minutes     Precautions: Standard, s/p L RCR-small with biceps tenodesis  POSTOPERATIVE PLAN: We will follow the arthroscopic rotator cuff repair guidelines for a small size rotator cuff tear.  We discussed with the patient's family after surgery.  The patient will remain in a sling for 6 weeks.  PT to start at 1-2 weeks.     Cuff specific program:  Pendulum exercises and Codman's exercises in 5-7 days, protecting rotator cuff repair for 1 week by avoiding active motion program until 1 week.     PASSIVE ROM: ER side 30 degrees, Forward Flex 90 degrees, ABD - 60 degrees   Full AAROM/PROM starting at 5-7 days as tolerated        Subjective      Pt reports: no new complaints   he was compliant with home exercise program given last session.   Response to previous treatment:NA  Functional change: NA    Pain: 0/10  Location: left shoulder      Objective   Passive Range of Motion: Measured in degrees: 3/29/22  Shoulder Left Right   Flexion 165 175   Abduction 150 180   ER at 90 90 90   IR 80 80        Maxwell received therapeutic exercises to develop strength, endurance, ROM and posture for 30 minutes including:  UBE 5'/5' min fwd/back R4.5  Prone pillow w's and ext 3x10 with 5 sec  hold 1#  Waiters step out otb 2x15 with 5 sec hold L  LLLD ER S w/ #3 x 3 min  PROM    Maxwell participated in neuromuscular re-education activities to improve: Coordination, Proprioception and Posture for 30 minutes. The following activities were included:  SA on foam roller 2x10 with arm lift B  Ball on wall 4 way 30x red weighted ball  Modified side plank 10 sec x10 B  CC Lifts (7#)/chops (10#) 2x10 B          Home Exercises Provided and Patient Education Provided     Education provided:   - HEP to Go    Written Home Exercises Provided: Patient instructed to cont prior HEP.  Exercises were reviewed and Maxwell was able to demonstrate them prior to the end of the session.  Maxwell demonstrated good  understanding of the education provided.     See EMR under Patient Instructions for exercises provided     Assessment     Maxwell came in today w/ IR/ER PROM stiffness. Was given HEP for IR stretch at home and told to start performing shoulder stretches 2x/day. OH strength is improving.   Maxwell Is progressing well towards his goals.   Pt prognosis is Good.     Pt will continue to benefit from skilled outpatient physical therapy to address the deficits listed in the problem list box on initial evaluation, provide pt/family education and to maximize pt's level of independence in the home and community environment.     Pt's spiritual, cultural and educational needs considered and pt agreeable to plan of care and goals.    Anticipated barriers to physical therapy: none    Goals:   Short Term Goals: (4 weeks)   - Pt will increase ROM to 100 deg L shoulder flex and abd (met)  - Decrease Pain to 4/10 as worst with all PT interventions (met)  - Pt to self correct posture with minimal cues (met)  - Pt independent with HEP with progressions. (met)     Long Term Goals (24 Weeks):  - Pt will increase ROM to 180 deg L shoulder flex and abd, 90 deg ER, 80 deg IR (Progressing, not met)  - Pt will increase strength to 5/5 LUE in all planes  (Progressing, not met)  - Decrease Pain to 0/10 with lifting 35# for work specific tasks (Progressing, not met)  - Pt to return to 80% PLOF (Progressing, not met)    Plan     Continue POC per pt tolerance progressing shoulder ROM and strength per protocol.    Juany Fofana, PTA

## 2022-05-03 ENCOUNTER — CLINICAL SUPPORT (OUTPATIENT)
Dept: REHABILITATION | Facility: HOSPITAL | Age: 47
End: 2022-05-03
Payer: COMMERCIAL

## 2022-05-03 DIAGNOSIS — M25.512 ACUTE PAIN OF LEFT SHOULDER: Primary | ICD-10-CM

## 2022-05-03 PROCEDURE — 97110 THERAPEUTIC EXERCISES: CPT

## 2022-05-03 PROCEDURE — 97112 NEUROMUSCULAR REEDUCATION: CPT

## 2022-05-03 NOTE — PROGRESS NOTES
Physical Therapy Daily Treatment Note     Name: Maxwell [facility] Number: [id number]    Therapy Diagnosis:   Encounter Diagnosis   Name Primary?    Acute pain of left shoulder Yes     Physician: Karson Thakkar III, *    Visit Date: 5/3/2022    Physician Orders: PT Eval and Treat   Medical Diagnosis:   M75.20 (ICD-10-CM) - Biceps tendinitis, unspecified laterality   S46.012A (ICD-10-CM) - Traumatic tear of left rotator cuff, unspecified tear extent, initial encounter         Date of Surgery: 1/4/22  Evaluation Date: 1/12/2022  Authorization Period Expiration: 1/3/23  Plan of Care Certification Period: 6/29/22  Visit # / Visits authorized: 22/ 20  DOS: 1/4/22     Time In: 300 pm  Time Out: 400 pm  Total Billable Time: 60 minutes     Precautions: Standard, s/p L RCR-small with biceps tenodesis  POSTOPERATIVE PLAN: We will follow the arthroscopic rotator cuff repair guidelines for a small size rotator cuff tear.  We discussed with the patient's family after surgery.  The patient will remain in a sling for 6 weeks.  PT to start at 1-2 weeks.     Cuff specific program:  Pendulum exercises and Codman's exercises in 5-7 days, protecting rotator cuff repair for 1 week by avoiding active motion program until 1 week.     PASSIVE ROM: ER side 30 degrees, Forward Flex 90 degrees, ABD - 60 degrees   Full AAROM/PROM starting at 5-7 days as tolerated        Subjective      Pt reports: he is feeling good.    he was compliant with home exercise program given last session.   Response to previous treatment:NA  Functional change: NA    Pain: 0/10  Location: left shoulder      Objective   Passive Range of Motion: Measured in degrees: 3/29/22  Shoulder Left Right   Flexion 165 175   Abduction 150 180   ER at 90 90 90   IR 80 80        Maxwell received therapeutic exercises to develop strength, endurance, ROM and posture for 35 minutes including:  UBE 5'/5' min fwd/back R4.5  Prone pillow w's and ext 3x10 with 5 sec  hold 1#  Waiters step out otb 2x15 with 5 sec hold L-np  LLLD ER S w/ #3 x 3 min-np  PROM-np    Maxwell participated in neuromuscular re-education activities to improve: Coordination, Proprioception and Posture for 35 minutes. The following activities were included:  SA on foam roller 2x10 with ytb   Ball on wall 4 way 30x red weighted ball-np  Modified side plank 20 sec x5 B  CC Lifts (7#)/chops (13#) 3x10 B  Wall clock ytb 3x10  SL red ball toss at rebounder 2x 20          Home Exercises Provided and Patient Education Provided     Education provided:   - HEP to Go    Written Home Exercises Provided: Patient instructed to cont prior HEP.  Exercises were reviewed and Maxwell was able to demonstrate them prior to the end of the session.  Maxwell demonstrated good  understanding of the education provided.     See EMR under Patient Instructions for exercises provided     Assessment     Pt progressed resistance well with PNF patterns and tolerated LUE throwing well. He continues to show good compliance with HEP and is progressing appropriately toward functional goals.   Maxwell Is progressing well towards his goals.   Pt prognosis is Good.     Pt will continue to benefit from skilled outpatient physical therapy to address the deficits listed in the problem list box on initial evaluation, provide pt/family education and to maximize pt's level of independence in the home and community environment.     Pt's spiritual, cultural and educational needs considered and pt agreeable to plan of care and goals.    Anticipated barriers to physical therapy: none    Goals:   Short Term Goals: (4 weeks)   - Pt will increase ROM to 100 deg L shoulder flex and abd (met)  - Decrease Pain to 4/10 as worst with all PT interventions (met)  - Pt to self correct posture with minimal cues (met)  - Pt independent with HEP with progressions. (met)     Long Term Goals (24 Weeks):  - Pt will increase ROM to 180 deg L shoulder flex and abd, 90 deg ER, 80 deg IR  (Progressing, not met)  - Pt will increase strength to 5/5 LUE in all planes (Progressing, not met)  - Decrease Pain to 0/10 with lifting 35# for work specific tasks (Progressing, not met)  - Pt to return to 80% PLOF (Progressing, not met)    Plan     Continue POC per pt tolerance progressing shoulder ROM and strength per protocol.    Monika Fuchs, PT

## 2022-05-06 ENCOUNTER — CLINICAL SUPPORT (OUTPATIENT)
Dept: REHABILITATION | Facility: HOSPITAL | Age: 47
End: 2022-05-06
Payer: COMMERCIAL

## 2022-05-06 DIAGNOSIS — M25.512 ACUTE PAIN OF LEFT SHOULDER: Primary | ICD-10-CM

## 2022-05-06 PROCEDURE — 97110 THERAPEUTIC EXERCISES: CPT | Mod: CQ

## 2022-05-06 PROCEDURE — 97112 NEUROMUSCULAR REEDUCATION: CPT | Mod: CQ

## 2022-05-06 NOTE — PROGRESS NOTES
Physical Therapy Daily Treatment Note     Name: Maxwell [facility] Number: [id number]    Therapy Diagnosis:   Encounter Diagnosis   Name Primary?    Acute pain of left shoulder Yes     Physician: Karson Thakkar III, *    Visit Date: 5/6/2022    Physician Orders: PT Eval and Treat   Medical Diagnosis:   M75.20 (ICD-10-CM) - Biceps tendinitis, unspecified laterality   S46.012A (ICD-10-CM) - Traumatic tear of left rotator cuff, unspecified tear extent, initial encounter         Date of Surgery: 1/4/22  Evaluation Date: 1/12/2022  Authorization Period Expiration: 1/3/23  Plan of Care Certification Period: 6/29/22  Visit # / Visits authorized: 23/ 20  DOS: 1/4/22     Time In: 0700 pm  Time Out: 0800 pm  Total Billable Time: 60 minutes     Precautions: Standard, s/p L RCR-small with biceps tenodesis  POSTOPERATIVE PLAN: We will follow the arthroscopic rotator cuff repair guidelines for a small size rotator cuff tear.  We discussed with the patient's family after surgery.  The patient will remain in a sling for 6 weeks.  PT to start at 1-2 weeks.     Cuff specific program:  Pendulum exercises and Codman's exercises in 5-7 days, protecting rotator cuff repair for 1 week by avoiding active motion program until 1 week.     PASSIVE ROM: ER side 30 degrees, Forward Flex 90 degrees, ABD - 60 degrees   Full AAROM/PROM starting at 5-7 days as tolerated        Subjective      Pt reports:shoulder is doing good   he was compliant with home exercise program given last session.   Response to previous treatment:NA  Functional change: NA    Pain: 0/10  Location: left shoulder      Objective   Passive Range of Motion: Measured in degrees: 3/29/22  Shoulder Left Right   Flexion 165 175   Abduction 150 180   ER at 90 90 90   IR 80 80        Maxwell received therapeutic exercises to develop strength, endurance, ROM and posture for 25 minutes including:  UBE 4'/4' min fwd/back R5  Prone pillow w's and ext 3x10 with 5 sec  hold 1#  PROM    Maxwell participated in neuromuscular re-education activities to improve: Coordination, Proprioception and Posture for 35 minutes. The following activities were included:  SA on foam roller 2x10 with ytb   Modified side plank 20 sec x5 B  CC Lifts (7#)/chops (13#) 3x10 B  Wall clock ytb 3x10  SL red ball toss at rebounder 2x 20      Home Exercises Provided and Patient Education Provided     Education provided:   - HEP to Go    Written Home Exercises Provided: Patient instructed to cont prior HEP.  Exercises were reviewed and Maxwell was able to demonstrate them prior to the end of the session.  Maxwell demonstrated good  understanding of the education provided.     See EMR under Patient Instructions for exercises provided     Assessment   PROM mainly restricted w/ IR. Is performing sleeper stretch at home but on couch. Will start to perform on floor for a firmer surface. Nonpainful crepitus felt during PROM IR. Good scapular control and strength w/ prone exercises. Side plank challenged.    Maxwell Is progressing well towards his goals.   Pt prognosis is Good.     Pt will continue to benefit from skilled outpatient physical therapy to address the deficits listed in the problem list box on initial evaluation, provide pt/family education and to maximize pt's level of independence in the home and community environment.     Pt's spiritual, cultural and educational needs considered and pt agreeable to plan of care and goals.    Anticipated barriers to physical therapy: none    Goals:   Short Term Goals: (4 weeks)   - Pt will increase ROM to 100 deg L shoulder flex and abd (met)  - Decrease Pain to 4/10 as worst with all PT interventions (met)  - Pt to self correct posture with minimal cues (met)  - Pt independent with HEP with progressions. (met)     Long Term Goals (24 Weeks):  - Pt will increase ROM to 180 deg L shoulder flex and abd, 90 deg ER, 80 deg IR (Progressing, not met)  - Pt will increase strength to 5/5 LUE  in all planes (Progressing, not met)  - Decrease Pain to 0/10 with lifting 35# for work specific tasks (Progressing, not met)  - Pt to return to 80% PLOF (Progressing, not met)    Plan     Continue POC per pt tolerance progressing shoulder ROM and strength per protocol.Dl Mcbride        I certify that I was present in the room directing the student in service delivery and guiding them using my skilled judgment. As the co-signing therapist I have reviewed the students documentation and am responsible for the treatment, assessment, and plan.       Juany Fofana, PTA

## 2022-05-07 ENCOUNTER — IMMUNIZATION (OUTPATIENT)
Dept: INTERNAL MEDICINE | Facility: CLINIC | Age: 47
End: 2022-05-07
Payer: COMMERCIAL

## 2022-05-07 DIAGNOSIS — Z23 NEED FOR VACCINATION: Primary | ICD-10-CM

## 2022-05-07 PROCEDURE — 91300 COVID-19, MRNA, LNP-S, PF, 30 MCG/0.3 ML DOSE VACCINE: CPT | Mod: PBBFAC | Performed by: INTERNAL MEDICINE

## 2022-05-10 ENCOUNTER — CLINICAL SUPPORT (OUTPATIENT)
Dept: REHABILITATION | Facility: HOSPITAL | Age: 47
End: 2022-05-10
Payer: COMMERCIAL

## 2022-05-10 DIAGNOSIS — M25.512 ACUTE PAIN OF LEFT SHOULDER: Primary | ICD-10-CM

## 2022-05-10 PROCEDURE — 97110 THERAPEUTIC EXERCISES: CPT

## 2022-05-10 PROCEDURE — 97112 NEUROMUSCULAR REEDUCATION: CPT

## 2022-05-10 NOTE — PROGRESS NOTES
Physical Therapy Daily Treatment Note     Name: Maxwell [facility] Number: [id number]    Therapy Diagnosis:   Encounter Diagnosis   Name Primary?    Acute pain of left shoulder Yes     Physician: Karson Thakkar III, *    Visit Date: 5/10/2022    Physician Orders: PT Eval and Treat   Medical Diagnosis:   M75.20 (ICD-10-CM) - Biceps tendinitis, unspecified laterality   S46.012A (ICD-10-CM) - Traumatic tear of left rotator cuff, unspecified tear extent, initial encounter         Date of Surgery: 1/4/22  Evaluation Date: 1/12/2022  Authorization Period Expiration: 1/3/23  Plan of Care Certification Period: 6/29/22  Visit # / Visits authorized: 2/ 20  DOS: 1/4/22     Time In: 0300 pm  Time Out: 0330 pm  Total Billable Time: 30 minutes     Precautions: Standard, s/p L RCR-small with biceps tenodesis  POSTOPERATIVE PLAN: We will follow the arthroscopic rotator cuff repair guidelines for a small size rotator cuff tear.  We discussed with the patient's family after surgery.  The patient will remain in a sling for 6 weeks.  PT to start at 1-2 weeks.     Cuff specific program:  Pendulum exercises and Codman's exercises in 5-7 days, protecting rotator cuff repair for 1 week by avoiding active motion program until 1 week.     PASSIVE ROM: ER side 30 degrees, Forward Flex 90 degrees, ABD - 60 degrees   Full AAROM/PROM starting at 5-7 days as tolerated        Subjective      Pt reports:shoulder is doing good.    he was compliant with home exercise program given last session.   Response to previous treatment:NA  Functional change: NA    Pain: 0/10  Location: left shoulder      Objective   Passive Range of Motion: Measured in degrees: 3/29/22  Shoulder Left Right   Flexion 165 175   Abduction 150 180   ER at 90 90 90   IR 80 80        Maxwell received therapeutic exercises to develop strength, endurance, ROM and posture for 20 minutes including:  UBE 4'/4' min fwd/back R5  Prone pillow w's and ext 3x10 with 5  sec hold 2#      Maxwell participated in neuromuscular re-education activities to improve: Coordination, Proprioception and Posture for 10 minutes. The following activities were included:  SA on foam roller 2x10 with ytb -np  Modified side plank 20 sec x5 B-np  CC Lifts (7#)/chops (13#) 3x10 B-np  Wall clock ytb 3x10-np  SL red ball toss at rebounder 2x 20      Home Exercises Provided and Patient Education Provided     Education provided:   - HEP to Go    Written Home Exercises Provided: Patient instructed to cont prior HEP.  Exercises were reviewed and Maxwell was able to demonstrate them prior to the end of the session.  Maxwell demonstrated good  understanding of the education provided.     See EMR under Patient Instructions for exercises provided     Assessment   Pt treatment focused on posterior RTC, endurance and stability. He continues to tolerate all exercises well with no c/o pain.  Maxwell Is progressing well towards his goals.   Pt prognosis is Good.     Pt will continue to benefit from skilled outpatient physical therapy to address the deficits listed in the problem list box on initial evaluation, provide pt/family education and to maximize pt's level of independence in the home and community environment.     Pt's spiritual, cultural and educational needs considered and pt agreeable to plan of care and goals.    Anticipated barriers to physical therapy: none    Goals:   Short Term Goals: (4 weeks)   - Pt will increase ROM to 100 deg L shoulder flex and abd (met)  - Decrease Pain to 4/10 as worst with all PT interventions (met)  - Pt to self correct posture with minimal cues (met)  - Pt independent with HEP with progressions. (met)     Long Term Goals (24 Weeks):  - Pt will increase ROM to 180 deg L shoulder flex and abd, 90 deg ER, 80 deg IR (Progressing, not met)  - Pt will increase strength to 5/5 LUE in all planes (Progressing, not met)  - Decrease Pain to 0/10 with lifting 35# for work specific tasks (Progressing,  not met)  - Pt to return to 80% PLOF (Progressing, not met)    Plan     Continue POC per pt tolerance progressing shoulder ROM and strength per protocol.    Monika Fuchs, PT

## 2022-05-13 ENCOUNTER — CLINICAL SUPPORT (OUTPATIENT)
Dept: REHABILITATION | Facility: HOSPITAL | Age: 47
End: 2022-05-13
Payer: COMMERCIAL

## 2022-05-13 DIAGNOSIS — M25.512 ACUTE PAIN OF LEFT SHOULDER: Primary | ICD-10-CM

## 2022-05-13 PROCEDURE — 97112 NEUROMUSCULAR REEDUCATION: CPT | Mod: CQ

## 2022-05-13 PROCEDURE — 97110 THERAPEUTIC EXERCISES: CPT | Mod: CQ

## 2022-05-13 NOTE — PROGRESS NOTES
Physical Therapy Daily Treatment Note     Name: Maxwell [facility] Number: [id number]    Therapy Diagnosis:   Encounter Diagnosis   Name Primary?    Acute pain of left shoulder Yes     Physician: Karson Thakkar III, *    Visit Date: 5/13/2022    Physician Orders: PT Eval and Treat   Medical Diagnosis:   M75.20 (ICD-10-CM) - Biceps tendinitis, unspecified laterality   S46.012A (ICD-10-CM) - Traumatic tear of left rotator cuff, unspecified tear extent, initial encounter         Date of Surgery: 1/4/22  Evaluation Date: 1/12/2022  Authorization Period Expiration: 1/3/23  Plan of Care Certification Period: 6/29/22  Visit # / Visits authorized: 3/ 20  DOS: 1/4/22     Time In: 0705am  Time Out: 0800am  Total Billable Time: 55 minutes     Precautions: Standard, s/p L RCR-small with biceps tenodesis  POSTOPERATIVE PLAN: We will follow the arthroscopic rotator cuff repair guidelines for a small size rotator cuff tear.  We discussed with the patient's family after surgery.  The patient will remain in a sling for 6 weeks.  PT to start at 1-2 weeks.     Cuff specific program:  Pendulum exercises and Codman's exercises in 5-7 days, protecting rotator cuff repair for 1 week by avoiding active motion program until 1 week.     PASSIVE ROM: ER side 30 degrees, Forward Flex 90 degrees, ABD - 60 degrees   Full AAROM/PROM starting at 5-7 days as tolerated        Subjective      Pt reports: no pain in L shld and ready for PT session.   he was compliant with home exercise program given last session.   Response to previous treatment:NA  Functional change: NA    Pain: 0/10  Location: left shoulder      Objective   Passive Range of Motion: Measured in degrees: 5/13/22  Shoulder Left Right   Flexion 175 175   Abduction    ER at 90 90 90   IR 80 80        Maxwell received therapeutic exercises to develop strength, endurance, ROM and posture for 30 minutes including:  UBE 5'/5' min fwd/back R5  Prone pillow w's and  ext 3x10 with 5 sec hold 2#  L shld assessment      Maxwell participated in neuromuscular re-education activities to improve: Coordination, Proprioception and Posture for 25 minutes. The following activities were included:  SA on foam roller 2x10 with ytb   Modified side plank 20 sec x5 B  CC Lifts (7#)/chops (13#) 3x10 B  Wall clock ytb 3x10-np  SL red ball toss at rebounder 2x 20-NP      Home Exercises Provided and Patient Education Provided     Education provided:   - HEP to Go    Written Home Exercises Provided: Patient instructed to cont prior HEP.  Exercises were reviewed and Maxwell was able to demonstrate them prior to the end of the session.  Maxwell demonstrated good  understanding of the education provided.     See EMR under Patient Instructions for exercises provided     Assessment   Improved FL PROM by 10 degrees. Challenged on Side Planks and SA wall slides with YTB with showing signs of fatigue due to shakiness. He continues to tolerate all exercises well with no c/o pain.  Maxwell Is progressing well towards his goals.   Pt prognosis is Good.     Pt will continue to benefit from skilled outpatient physical therapy to address the deficits listed in the problem list box on initial evaluation, provide pt/family education and to maximize pt's level of independence in the home and community environment.     Pt's spiritual, cultural and educational needs considered and pt agreeable to plan of care and goals.    Anticipated barriers to physical therapy: none    Goals:   Short Term Goals: (4 weeks)   - Pt will increase ROM to 100 deg L shoulder flex and abd (met)  - Decrease Pain to 4/10 as worst with all PT interventions (met)  - Pt to self correct posture with minimal cues (met)  - Pt independent with HEP with progressions. (met)     Long Term Goals (24 Weeks):  - Pt will increase ROM to 180 deg L shoulder flex and abd, 90 deg ER, 80 deg IR (Progressing, not met)  - Pt will increase strength to 5/5 LUE in all planes  (Progressing, not met)  - Decrease Pain to 0/10 with lifting 35# for work specific tasks (Progressing, not met)  - Pt to return to 80% PLOF (Progressing, not met)    Plan     Continue POC per pt tolerance progressing shoulder ROM and strength per protocol.  Dl Espino, SPTA    I certify that I was present in the room directing the student in service delivery and guiding them using my skilled judgment. As the co-signing therapist I have reviewed the students documentation and am responsible for the treatment, assessment, and plan.       Juany Fofana, PTA

## 2022-05-17 ENCOUNTER — CLINICAL SUPPORT (OUTPATIENT)
Dept: REHABILITATION | Facility: HOSPITAL | Age: 47
End: 2022-05-17
Payer: COMMERCIAL

## 2022-05-17 DIAGNOSIS — M25.512 ACUTE PAIN OF LEFT SHOULDER: Primary | ICD-10-CM

## 2022-05-17 PROCEDURE — 97112 NEUROMUSCULAR REEDUCATION: CPT

## 2022-05-17 PROCEDURE — 97110 THERAPEUTIC EXERCISES: CPT

## 2022-05-17 NOTE — PROGRESS NOTES
Physical Therapy Daily Treatment Note     Name: Maxwell [facility] Number: [id number]    Therapy Diagnosis:   Encounter Diagnosis   Name Primary?    Acute pain of left shoulder Yes     Physician: Karson Thakkar III, *    Visit Date: 5/17/2022    Physician Orders: PT Eval and Treat   Medical Diagnosis:   M75.20 (ICD-10-CM) - Biceps tendinitis, unspecified laterality   S46.012A (ICD-10-CM) - Traumatic tear of left rotator cuff, unspecified tear extent, initial encounter         Date of Surgery: 1/4/22  Evaluation Date: 1/12/2022  Authorization Period Expiration: 1/3/23  Plan of Care Certification Period: 6/29/22  Visit # / Visits authorized: 4/ 20  DOS: 1/4/22     Time In: 300 pm  Time Out: 355 pm  Total Billable Time: 55 minutes     Precautions: Standard, s/p L RCR-small with biceps tenodesis      Subjective      Pt reports: he is ready to progress today.    he was compliant with home exercise program given last session.   Response to previous treatment:NA  Functional change: NA    Pain: 0/10  Location: left shoulder      Objective   Passive Range of Motion: Measured in degrees: 5/13/22  Shoulder Left Right   Flexion 175 175   Abduction    ER at 90 90 90   IR 80 80        Maxwell received therapeutic exercises to develop strength, endurance, ROM and posture for 15 minutes including:  UBE 5'/5' min fwd/back R5  Prone pillow w's and ext 3x10 with 5 sec hold 2#-np  1/2 foam pec stretch x5 min      Maxwell participated in neuromuscular re-education activities to improve: Coordination, Proprioception and Posture for 40 minutes. The following activities were included:  SA on foam roller 3x10 with gtb   Modified side plank 30 sec x5 B  CC Lifts (13#)/chops (13#) 3x10 B  Wall clock ytb 3x10-np  1/2 circles OH wall dribbles 3x fatigue  SL red ball toss at rebounder 2x 20-NP  Cheerleaders gtb 3x10       Home Exercises Provided and Patient Education Provided     Education provided:   - HEP to  Go    Written Home Exercises Provided: Patient instructed to cont prior HEP.  Exercises were reviewed and Maxwell was able to demonstrate them prior to the end of the session.  Maxwell demonstrated good  understanding of the education provided.     See EMR under Patient Instructions for exercises provided     Assessment   Pt tolerated increased resistance well this session with appropriate fatigue noted. Pt making excellent progress toward functional goals.  Maxwell Is progressing well towards his goals.   Pt prognosis is Good.     Pt will continue to benefit from skilled outpatient physical therapy to address the deficits listed in the problem list box on initial evaluation, provide pt/family education and to maximize pt's level of independence in the home and community environment.     Pt's spiritual, cultural and educational needs considered and pt agreeable to plan of care and goals.    Anticipated barriers to physical therapy: none    Goals:   Short Term Goals: (4 weeks)   - Pt will increase ROM to 100 deg L shoulder flex and abd (met)  - Decrease Pain to 4/10 as worst with all PT interventions (met)  - Pt to self correct posture with minimal cues (met)  - Pt independent with HEP with progressions. (met)     Long Term Goals (24 Weeks):  - Pt will increase ROM to 180 deg L shoulder flex and abd, 90 deg ER, 80 deg IR (Progressing, not met)  - Pt will increase strength to 5/5 LUE in all planes (Progressing, not met)  - Decrease Pain to 0/10 with lifting 35# for work specific tasks (Progressing, not met)  - Pt to return to 80% PLOF (Progressing, not met)    Plan     Continue POC per pt tolerance progressing shoulder ROM and strength per protocol.      Monika Fuchs, PT

## 2022-05-20 ENCOUNTER — CLINICAL SUPPORT (OUTPATIENT)
Dept: REHABILITATION | Facility: HOSPITAL | Age: 47
End: 2022-05-20
Payer: COMMERCIAL

## 2022-05-20 DIAGNOSIS — M25.512 ACUTE PAIN OF LEFT SHOULDER: Primary | ICD-10-CM

## 2022-05-20 PROCEDURE — 97110 THERAPEUTIC EXERCISES: CPT | Mod: CQ

## 2022-05-20 PROCEDURE — 97112 NEUROMUSCULAR REEDUCATION: CPT | Mod: CQ

## 2022-05-20 NOTE — PROGRESS NOTES
Physical Therapy Daily Treatment Note     Name: Maxwell [facility] Number: [id number]    Therapy Diagnosis:   Encounter Diagnosis   Name Primary?    Acute pain of left shoulder Yes     Physician: Karson Thakkar III, *    Visit Date: 5/20/2022    Physician Orders: PT Eval and Treat   Medical Diagnosis:   M75.20 (ICD-10-CM) - Biceps tendinitis, unspecified laterality   S46.012A (ICD-10-CM) - Traumatic tear of left rotator cuff, unspecified tear extent, initial encounter         Date of Surgery: 1/4/22  Evaluation Date: 1/12/2022  Authorization Period Expiration: 1/3/23  Plan of Care Certification Period: 6/29/22  Visit # / Visits authorized: 5/ 20  DOS: 1/4/22     Time In: 700 am  Time Out: 800am  Total Billable Time: 60 minutes     Precautions: Standard, s/p L RCR-small with biceps tenodesis      Subjective      Pt reports: Shoulder is doing just fine.   he was compliant with home exercise program given last session.   Response to previous treatment:NA  Functional change: NA    Pain: 0/10  Location: left shoulder      Objective   Passive Range of Motion: Measured in degrees: 5/13/22  Shoulder Left Right   Flexion 175 175   Abduction    ER at 90 90 90   IR 80 80        Maxwell received therapeutic exercises to develop strength, endurance, ROM and posture for 25minutes including:  UBE 5'/5' min fwd/back R5  Prone pillow w's and ext 3x10 with 5 sec hold 2#  1/2 foam pec stretch x5 min      Maxwell participated in neuromuscular re-education activities to improve: Coordination, Proprioception and Posture for 35 minutes. The following activities were included:  SA on foam roller 3x10 with gtb   Modified side plank 30 sec x5 B  CC Lifts (7#)/chops (13#) 3x10 B  Wall clock ytb 3x10-np  1/2 circles OH wall dribbles 3x fatigue-NP  SL red ball toss at rebounder 2x 20-NP  Cheerleaders gtb 3x10       Home Exercises Provided and Patient Education Provided     Education provided:   - HEP to Go    Written  Home Exercises Provided: Patient instructed to cont prior HEP.  Exercises were reviewed and Maxwell was able to demonstrate them prior to the end of the session.  Maxwell demonstrated good  understanding of the education provided.     See EMR under Patient Instructions for exercises provided     Assessment   Pt is still appropriately challenged w/ interventions. Wu session well w/o adverse reaction.   Maxwell Is progressing well towards his goals.   Pt prognosis is Good.     Pt will continue to benefit from skilled outpatient physical therapy to address the deficits listed in the problem list box on initial evaluation, provide pt/family education and to maximize pt's level of independence in the home and community environment.     Pt's spiritual, cultural and educational needs considered and pt agreeable to plan of care and goals.    Anticipated barriers to physical therapy: none    Goals:   Short Term Goals: (4 weeks)   - Pt will increase ROM to 100 deg L shoulder flex and abd (met)  - Decrease Pain to 4/10 as worst with all PT interventions (met)  - Pt to self correct posture with minimal cues (met)  - Pt independent with HEP with progressions. (met)     Long Term Goals (24 Weeks):  - Pt will increase ROM to 180 deg L shoulder flex and abd, 90 deg ER, 80 deg IR (Progressing, not met)  - Pt will increase strength to 5/5 LUE in all planes (Progressing, not met)  - Decrease Pain to 0/10 with lifting 35# for work specific tasks (Progressing, not met)  - Pt to return to 80% PLOF (Progressing, not met)    Plan     Continue POC per pt tolerance progressing shoulder ROM and strength per protocol.    Dl Espino, SPTWARREN Fofana, PTA

## 2022-05-24 ENCOUNTER — CLINICAL SUPPORT (OUTPATIENT)
Dept: REHABILITATION | Facility: HOSPITAL | Age: 47
End: 2022-05-24
Attending: ORTHOPAEDIC SURGERY
Payer: COMMERCIAL

## 2022-05-24 DIAGNOSIS — M25.512 ACUTE PAIN OF LEFT SHOULDER: Primary | ICD-10-CM

## 2022-05-24 PROCEDURE — 97112 NEUROMUSCULAR REEDUCATION: CPT

## 2022-05-24 PROCEDURE — 97110 THERAPEUTIC EXERCISES: CPT

## 2022-05-24 NOTE — PROGRESS NOTES
Physical Therapy Daily Treatment Note     Name: [facility] Number: [id number]    Therapy Diagnosis:   Encounter Diagnosis   Name Primary?    Acute pain of left shoulder Yes     Physician: Karson Thakkar III, *    Visit Date: 5/24/2022    Physician Orders: PT Eval and Treat   Medical Diagnosis:   M75.20 (ICD-10-CM) - Biceps tendinitis, unspecified laterality   S46.012A (ICD-10-CM) - Traumatic tear of left rotator cuff, unspecified tear extent, initial encounter         Date of Surgery: 1/4/22  Evaluation Date: 1/12/2022  Authorization Period Expiration: 1/3/23  Plan of Care Certification Period: 6/29/22  Visit # / Visits authorized: 6/ 20  DOS: 1/4/22     Time In: 300 pm  Time Out: 355 pm  Total Billable Time: 55 minutes     Precautions: Standard, s/p L RCR-small with biceps tenodesis      Subjective      Pt reports: he continues to use his L shoulder more.    he was compliant with home exercise program given last session.   Response to previous treatment:NA  Functional change: NA    Pain: 0/10  Location: left shoulder      Objective   Passive Range of Motion: Measured in degrees: 5/24/22  Shoulder Left Right   Flexion 175 175   Abduction    ER at 90 90 90   IR 80 80    MMT LUE:  5/5 abd, IR  4/5 flex and ER    Maxwell received therapeutic exercises to develop strength, endurance, ROM and posture for 25 minutes including:  UBE 5'/5' min fwd/back R5  Prone pillow w's and ext 3x10 with 5 sec hold 2#-np  OH flexion with btb x30 with 5 sec hold at top  1/2 foam pec stretch x5 min      Maxwell participated in neuromuscular re-education activities to improve: Coordination, Proprioception and Posture for 30 minutes. The following activities were included:  SA on foam roller 3x10 with btb   Waiters carry step out gtb 2x15 with 5 sec hold IR/ER L  Modified side plank 30 sec x5 B-np  CC Lifts (7#)/chops (13#) 3x10 B  Wall clock ytb 3x10-np  1/2 circles yellow ball and OH wall dribbles 2x fatigue  clement CONNOR yellow ball toss at rebounder 2x 20  Cheerleaders gtb 3x10 -np      Home Exercises Provided and Patient Education Provided     Education provided:   - HEP to Go    Written Home Exercises Provided: Patient instructed to cont prior HEP.  Exercises were reviewed and Maxwell was able to demonstrate them prior to the end of the session.  Maxwell demonstrated good  understanding of the education provided.     See EMR under Patient Instructions for exercises provided     Assessment   Upon reassessment, of MMT pt still shows weakness with flexion and ER. He would continue to benefit from more functional strengthening, scapular endurance and specific exercises to address flexion and ER strength deficits.   Maxwell Is progressing well towards his goals.   Pt prognosis is Good.     Pt will continue to benefit from skilled outpatient physical therapy to address the deficits listed in the problem list box on initial evaluation, provide pt/family education and to maximize pt's level of independence in the home and community environment.     Pt's spiritual, cultural and educational needs considered and pt agreeable to plan of care and goals.    Anticipated barriers to physical therapy: none    Goals:   Short Term Goals: (4 weeks)   - Pt will increase ROM to 100 deg L shoulder flex and abd (met)  - Decrease Pain to 4/10 as worst with all PT interventions (met)  - Pt to self correct posture with minimal cues (met)  - Pt independent with HEP with progressions. (met)     Long Term Goals (24 Weeks):  - Pt will increase ROM to 180 deg L shoulder flex and abd, 90 deg ER, 80 deg IR (Progressing, not met)  - Pt will increase strength to 5/5 LUE in all planes (Progressing, not met)  - Decrease Pain to 0/10 with lifting 35# for work specific tasks (Progressing, not met)  - Pt to return to 80% PLOF (Progressing, not met)    Plan     Continue POC per pt tolerance progressing shoulder ROM and strength.      Monika Fuchs, PT

## 2022-06-10 ENCOUNTER — CLINICAL SUPPORT (OUTPATIENT)
Dept: REHABILITATION | Facility: HOSPITAL | Age: 47
End: 2022-06-10
Payer: COMMERCIAL

## 2022-06-10 DIAGNOSIS — M25.512 ACUTE PAIN OF LEFT SHOULDER: Primary | ICD-10-CM

## 2022-06-10 PROCEDURE — 97110 THERAPEUTIC EXERCISES: CPT | Mod: CQ

## 2022-06-10 PROCEDURE — 97112 NEUROMUSCULAR REEDUCATION: CPT | Mod: CQ

## 2022-06-10 NOTE — PROGRESS NOTES
"                          Physical Therapy Daily Treatment Note     Name: [facility] Number: [id number]    Therapy Diagnosis:   Encounter Diagnosis   Name Primary?    Acute pain of left shoulder Yes     Physician: Karson Thakkar III, *    Visit Date: 6/10/2022    Physician Orders: PT Eval and Treat   Medical Diagnosis:   M75.20 (ICD-10-CM) - Biceps tendinitis, unspecified laterality   S46.012A (ICD-10-CM) - Traumatic tear of left rotator cuff, unspecified tear extent, initial encounter         Date of Surgery: 1/4/22  Evaluation Date: 1/12/2022  Authorization Period Expiration: 1/3/23  Plan of Care Certification Period: 6/29/22  Visit # / Visits authorized: 7/ 20  DOS: 1/4/22     Time In: 0700   Time Out:0802  Total Billable Time: 62 minutes     Precautions: Standard, s/p L RCR-small with biceps tenodesis      Subjective      Pt reports: Pt stated " my shoulder is feeling good this morning." Pt did not perform HEP on vacation.   Response to previous treatment:NA  Functional change: NA    Pain: 0/10  Location: left shoulder      Objective   Passive Range of Motion: Measured in degrees: 5/24/22  Shoulder Left Right   Flexion 175 175   Abduction    ER at 90 90 90   IR 80 80    MMT LUE:  5/5 abd, IR  4/5 flex and ER    Maxwell received therapeutic exercises to develop strength, endurance, ROM and posture for 25 minutes including:  UBE 5'/5' min fwd/back R5  OH flexion with btb x30 with 5 sec hold at top  1/2 foam pec stretch x5 min    Maxwell participated in neuromuscular re-education activities to improve: Coordination, Proprioception and Posture for 37 minutes. The following activities were included:  SA on foam roller 3x10 with btb   Waiters carry step out gtb 2x15 with 5 sec hold IR/ER L  Modified side plank 30 sec x5 B-np  CC Lifts (7#)/chops (13#) 3x10 B  1/2 circles yellow ball and OH wall dribbles 2x fatigue ea  SL yellow ball toss at rebounder 2x 20        Home Exercises Provided and Patient Education " Provided     Education provided:   - HEP to Go    Written Home Exercises Provided: Patient instructed to cont prior HEP.  Exercises were reviewed and Maxwell was able to demonstrate them prior to the end of the session.  Maxwell demonstrated good  understanding of the education provided.     See EMR under Patient Instructions for exercises provided     Assessment    Pt returning from 3 wk vacation and is showing good carry over of ROM as tested previous session. ABD WNL. Shoulder fatigue w/ ball taps as well as ER catch. Cont to progress as yumiko focusing on periscapular endurance as well as FL/ER strength.  Maxwell Is progressing well towards his goals.   Pt prognosis is Good.     Pt will continue to benefit from skilled outpatient physical therapy to address the deficits listed in the problem list box on initial evaluation, provide pt/family education and to maximize pt's level of independence in the home and community environment.     Pt's spiritual, cultural and educational needs considered and pt agreeable to plan of care and goals.    Anticipated barriers to physical therapy: none    Goals:   Short Term Goals: (4 weeks)   - Pt will increase ROM to 100 deg L shoulder flex and abd (met)  - Decrease Pain to 4/10 as worst with all PT interventions (met)  - Pt to self correct posture with minimal cues (met)  - Pt independent with HEP with progressions. (met)     Long Term Goals (24 Weeks):  - Pt will increase ROM to 180 deg L shoulder flex and abd, 90 deg ER, 80 deg IR (Progressing, not met)  - Pt will increase strength to 5/5 LUE in all planes (Progressing, not met)  - Decrease Pain to 0/10 with lifting 35# for work specific tasks (Progressing, not met)  - Pt to return to 80% PLOF (Progressing, not met)    Plan     Continue POC per pt tolerance progressing shoulder ROM and strength.  Marty Reed SPTA  I certify that I was present in the room directing the student in service delivery and guiding them using my skilled  judgment. As the co-signing therapist I have reviewed the students documentation and am responsible for the treatment, assessment, and plan.       Juany Fofana, PTA

## 2022-06-13 NOTE — PROGRESS NOTES
Physical Therapy Daily Treatment Note and Discharge Summary     Name: [facility] Number: [id number]    Therapy Diagnosis:   Encounter Diagnosis   Name Primary?    Acute pain of left shoulder Yes     Physician: Karson Thakkar III, *    Visit Date: 6/14/2022    Physician Orders: PT Eval and Treat   Medical Diagnosis:   M75.20 (ICD-10-CM) - Biceps tendinitis, unspecified laterality   S46.012A (ICD-10-CM) - Traumatic tear of left rotator cuff, unspecified tear extent, initial encounter      Date of Surgery: 1/4/22  Evaluation Date: 1/12/2022  Authorization Period Expiration: 1/3/23  Plan of Care Certification Period: 6/29/22  Visit # / Visits authorized: 7/ 20  DOS: 1/4/22     Time In: 15:00  Time Out: 15:40  Total Billable Time: 40 minutes     Precautions: Standard, s/p L RCR-small with biceps tenodesis    Subjective      Pt reports: no difficulties from the last visit. States over 95% better. Ready for discharge.    he was compliant with home exercise program given last session.   Response to previous treatment:NA  Functional change: NA    Pain: 0/10  Location: left shoulder      Objective     Passive Range of Motion: Measured in degrees: 6/14/2022  Shoulder Left Right   Flexion 180 175   Abduction    ER at 90 90 90   IR 80 80     Active Range of Motion: Measured in degrees: 6/14/2022  Shoulder Left Right   Flexion 180 175   Abduction    ER at 90 90 90   IR 80 80     MMT LUE:  5/5 abduction, internal rotation   5/5 flexion and external rotation  No pain and good mechanics with lifting of 35 pounds.    Maxwell received therapeutic exercises to develop strength, endurance, ROM and posture for 8 minutes including:  Wall Knocks - green medicine ball 30 seconds  Prone T and Y ball flips - green medicine ball; 30 seconds each  OH flexion with btb x30 with 5 sec hold at top  1/2 foam pec stretch x5 min -np      Maxwell participated in neuromuscular re-education activities to improve: Coordination, Proprioception  and Posture for 8 minutes. The following activities were included:  SA on foam roller 3x10 with btb -np  Waiters carry step out gtb 2x15 with 5 sec hold IR/ER L  Modified side plank 30 sec x5 B-np  CC Lifts (7#)/chops (13#) 3x10 bilateral - np  Wall clock ytb 3x10-np  1/2 circles yellow ball and OH wall dribbles 2x fatigue ea - np  SL yellow ball toss at rebounder 2x 20 - np  Cheerleaders gtb 3x10 -np  Golf swing assessment  Shovel toss: 8# medicine ball    Therapeutic Activities to improve functional performance for 23 minutes, including:  Reassessment and FOTO      Home Exercises Provided and Patient Education Provided     Education provided:   Home Exercise Program.  Discharge instructions.     Written Home Exercises Provided: yes.  Exercises were reviewed and Maxwell was able to demonstrate them prior to the end of the session.  Maxwell demonstrated good  understanding of the education provided.     See EMR under Patient Instructions for exercises provided     Assessment   Maxwell has met 100% of his goals as of the 31st visit. He is independent with his home exercise program and has returned to full work duties with no difficulties. Therefore, he is discharged from skilled physical therapy. Updated home exercise program with periscapular endurance, rotator cuff endurance, and return to golf exercises. He gave verbal acknowledgement and understanding of all instructions and education. Patient is discharged.    Maxwell Is progressing well towards his goals.   Pt prognosis is Good.     Anticipated barriers to physical therapy: none    Goals:   Short Term Goals: (4 weeks)   - Pt will increase ROM to 100 deg L shoulder flex and abd (met)  - Decrease Pain to 4/10 as worst with all PT interventions (met)  - Pt to self correct posture with minimal cues (met)  - Pt independent with HEP with progressions. (met)     Long Term Goals (24 Weeks):  - Pt will increase ROM to 180 deg L shoulder flex and abd, 90 deg ER, 80 deg IR (met)  - Pt  will increase strength to 5/5 LUE in all planes (met)  - Decrease Pain to 0/10 with lifting 35# for work specific tasks (met)  - Pt to return to 80% PLOF (met)    Plan     Patient is discharged from skilled physical therapy.    Imer Gandara, PT, DPT, OCS

## 2022-06-14 ENCOUNTER — CLINICAL SUPPORT (OUTPATIENT)
Dept: REHABILITATION | Facility: HOSPITAL | Age: 47
End: 2022-06-14
Payer: COMMERCIAL

## 2022-06-14 DIAGNOSIS — M25.512 ACUTE PAIN OF LEFT SHOULDER: Primary | ICD-10-CM

## 2022-06-14 PROCEDURE — 97530 THERAPEUTIC ACTIVITIES: CPT

## 2022-06-14 PROCEDURE — 97112 NEUROMUSCULAR REEDUCATION: CPT

## 2022-06-28 ENCOUNTER — TELEPHONE (OUTPATIENT)
Dept: SPORTS MEDICINE | Facility: CLINIC | Age: 47
End: 2022-06-28
Payer: COMMERCIAL

## 2022-06-28 NOTE — TELEPHONE ENCOUNTER
Left message for patient to call back to reschedule appointment off of 7/1/22. I offered patient to reschedule to Dr Heart next available appointments.

## 2022-07-11 ENCOUNTER — OFFICE VISIT (OUTPATIENT)
Dept: SPORTS MEDICINE | Facility: CLINIC | Age: 47
End: 2022-07-11
Payer: COMMERCIAL

## 2022-07-11 VITALS
HEIGHT: 73 IN | DIASTOLIC BLOOD PRESSURE: 102 MMHG | SYSTOLIC BLOOD PRESSURE: 147 MMHG | HEART RATE: 91 BPM | WEIGHT: 227 LBS | BODY MASS INDEX: 30.09 KG/M2

## 2022-07-11 DIAGNOSIS — Z98.890 S/P ROTATOR CUFF SURGERY: Primary | ICD-10-CM

## 2022-07-11 PROCEDURE — 3080F PR MOST RECENT DIASTOLIC BLOOD PRESSURE >= 90 MM HG: ICD-10-PCS | Mod: CPTII,S$GLB,, | Performed by: ORTHOPAEDIC SURGERY

## 2022-07-11 PROCEDURE — 99024 PR POST-OP FOLLOW-UP VISIT: ICD-10-PCS | Mod: S$GLB,,, | Performed by: ORTHOPAEDIC SURGERY

## 2022-07-11 PROCEDURE — 99999 PR PBB SHADOW E&M-EST. PATIENT-LVL III: CPT | Mod: PBBFAC,,, | Performed by: ORTHOPAEDIC SURGERY

## 2022-07-11 PROCEDURE — 99024 POSTOP FOLLOW-UP VISIT: CPT | Mod: S$GLB,,, | Performed by: ORTHOPAEDIC SURGERY

## 2022-07-11 PROCEDURE — 99999 PR PBB SHADOW E&M-EST. PATIENT-LVL III: ICD-10-PCS | Mod: PBBFAC,,, | Performed by: ORTHOPAEDIC SURGERY

## 2022-07-11 PROCEDURE — 3008F BODY MASS INDEX DOCD: CPT | Mod: CPTII,S$GLB,, | Performed by: ORTHOPAEDIC SURGERY

## 2022-07-11 PROCEDURE — 3008F PR BODY MASS INDEX (BMI) DOCUMENTED: ICD-10-PCS | Mod: CPTII,S$GLB,, | Performed by: ORTHOPAEDIC SURGERY

## 2022-07-11 PROCEDURE — 3077F SYST BP >= 140 MM HG: CPT | Mod: CPTII,S$GLB,, | Performed by: ORTHOPAEDIC SURGERY

## 2022-07-11 PROCEDURE — 3080F DIAST BP >= 90 MM HG: CPT | Mod: CPTII,S$GLB,, | Performed by: ORTHOPAEDIC SURGERY

## 2022-07-11 PROCEDURE — 1159F PR MEDICATION LIST DOCUMENTED IN MEDICAL RECORD: ICD-10-PCS | Mod: CPTII,S$GLB,, | Performed by: ORTHOPAEDIC SURGERY

## 2022-07-11 PROCEDURE — 3077F PR MOST RECENT SYSTOLIC BLOOD PRESSURE >= 140 MM HG: ICD-10-PCS | Mod: CPTII,S$GLB,, | Performed by: ORTHOPAEDIC SURGERY

## 2022-07-11 PROCEDURE — 1159F MED LIST DOCD IN RCRD: CPT | Mod: CPTII,S$GLB,, | Performed by: ORTHOPAEDIC SURGERY

## 2022-07-11 NOTE — PROGRESS NOTES
CC left shoulder pain    HISTORY OF PRESENT ILLNESS:   Pt is here today for post-operative followup of his shoulder arthroscopy.  he is doing well.  We have reviewed his findings and discussed plan of care and future treatment options.                         Patient has been attending physical therapy at the [facility] location, working with Monika CARBALLO.    Patient notes he is doing well and has no issues or concerns               SANE 7/11/2022: 90%  SANE post op: 30  SANE pre op: 35        DATE OF PROCEDURE: 01/04/2022  SURGEON: Marly Heart M.D.  OPERATION:   left  1. Shoulder arthroscopic rotator cuff repair (upper 1/8 subscapularis) with Cuff Mend ()  2. Shoulder arthroscopic biceps tenodesis (CPT 09567)  3. Shoulder arthroscopic subacromial decompression, bursectomy   4. Shoulder arthroscopic extensive debridement (anterior, posterior glenohumeral joint, subacromial space) (CPT 28749)  5. Shoulder arthroscopic labral debridement (CPT 98780)  6. Shoulder arthroscopic lysis of adhesions (CPT 49644)  7. Shoulder arthroscopic loose body removal    There was chondral damage to:  Humeral head: 20 x 15 mm grade 3  Glenoid: 5 x 15 mm grade 3  Chondroplasty was performed using arthroscopic shaver.                                              PHYSICAL EXAMINATION:     Incision sites healed well  No evidence of any erythema, infection or induration  elbow Range of motion full   No effusion  2+ DP pulse  No swelling       Forward Flexion: 175  ER: 55  IR: T10    Strength at 0 and 30: 5/5  ER: 5/5                                                                            ASSESSMENT:                                                                                                                                               1. Status post above, doing well.                                                                                                                               PLAN:                                                                                                                                                      1. Continue PT  2. Emphasized scapular function.  3. I have discussed return to activity in detail.  4. he will see us back in 12 weeks.                                      5. All questions were answered and he should contact us if he  has any questions or concerns in the interim.

## 2022-10-24 ENCOUNTER — LAB VISIT (OUTPATIENT)
Dept: LAB | Facility: HOSPITAL | Age: 47
End: 2022-10-24
Attending: INTERNAL MEDICINE
Payer: COMMERCIAL

## 2022-10-24 ENCOUNTER — OFFICE VISIT (OUTPATIENT)
Dept: INTERNAL MEDICINE | Facility: CLINIC | Age: 47
End: 2022-10-24
Payer: COMMERCIAL

## 2022-10-24 VITALS
BODY MASS INDEX: 30.19 KG/M2 | WEIGHT: 227.75 LBS | DIASTOLIC BLOOD PRESSURE: 84 MMHG | SYSTOLIC BLOOD PRESSURE: 140 MMHG | OXYGEN SATURATION: 97 % | HEART RATE: 76 BPM | HEIGHT: 73 IN

## 2022-10-24 DIAGNOSIS — G47.9 SLEEP DISORDER: ICD-10-CM

## 2022-10-24 DIAGNOSIS — G47.33 OSA (OBSTRUCTIVE SLEEP APNEA): ICD-10-CM

## 2022-10-24 DIAGNOSIS — G47.33 OSA (OBSTRUCTIVE SLEEP APNEA): Primary | ICD-10-CM

## 2022-10-24 PROCEDURE — 1159F PR MEDICATION LIST DOCUMENTED IN MEDICAL RECORD: ICD-10-PCS | Mod: CPTII,S$GLB,, | Performed by: INTERNAL MEDICINE

## 2022-10-24 PROCEDURE — 99214 PR OFFICE/OUTPT VISIT, EST, LEVL IV, 30-39 MIN: ICD-10-PCS | Mod: S$GLB,,, | Performed by: INTERNAL MEDICINE

## 2022-10-24 PROCEDURE — 84443 ASSAY THYROID STIM HORMONE: CPT | Performed by: INTERNAL MEDICINE

## 2022-10-24 PROCEDURE — 85025 COMPLETE CBC W/AUTO DIFF WBC: CPT | Performed by: INTERNAL MEDICINE

## 2022-10-24 PROCEDURE — 99999 PR PBB SHADOW E&M-EST. PATIENT-LVL III: CPT | Mod: PBBFAC,,, | Performed by: INTERNAL MEDICINE

## 2022-10-24 PROCEDURE — 1159F MED LIST DOCD IN RCRD: CPT | Mod: CPTII,S$GLB,, | Performed by: INTERNAL MEDICINE

## 2022-10-24 PROCEDURE — 1160F PR REVIEW ALL MEDS BY PRESCRIBER/CLIN PHARMACIST DOCUMENTED: ICD-10-PCS | Mod: CPTII,S$GLB,, | Performed by: INTERNAL MEDICINE

## 2022-10-24 PROCEDURE — 3079F PR MOST RECENT DIASTOLIC BLOOD PRESSURE 80-89 MM HG: ICD-10-PCS | Mod: CPTII,S$GLB,, | Performed by: INTERNAL MEDICINE

## 2022-10-24 PROCEDURE — 3079F DIAST BP 80-89 MM HG: CPT | Mod: CPTII,S$GLB,, | Performed by: INTERNAL MEDICINE

## 2022-10-24 PROCEDURE — 80053 COMPREHEN METABOLIC PANEL: CPT | Performed by: INTERNAL MEDICINE

## 2022-10-24 PROCEDURE — 1160F RVW MEDS BY RX/DR IN RCRD: CPT | Mod: CPTII,S$GLB,, | Performed by: INTERNAL MEDICINE

## 2022-10-24 PROCEDURE — 99214 OFFICE O/P EST MOD 30 MIN: CPT | Mod: S$GLB,,, | Performed by: INTERNAL MEDICINE

## 2022-10-24 PROCEDURE — 36415 COLL VENOUS BLD VENIPUNCTURE: CPT | Performed by: INTERNAL MEDICINE

## 2022-10-24 PROCEDURE — 3077F SYST BP >= 140 MM HG: CPT | Mod: CPTII,S$GLB,, | Performed by: INTERNAL MEDICINE

## 2022-10-24 PROCEDURE — 99999 PR PBB SHADOW E&M-EST. PATIENT-LVL III: ICD-10-PCS | Mod: PBBFAC,,, | Performed by: INTERNAL MEDICINE

## 2022-10-24 PROCEDURE — 3077F PR MOST RECENT SYSTOLIC BLOOD PRESSURE >= 140 MM HG: ICD-10-PCS | Mod: CPTII,S$GLB,, | Performed by: INTERNAL MEDICINE

## 2022-10-24 RX ORDER — TRAZODONE HYDROCHLORIDE 50 MG/1
50 TABLET ORAL NIGHTLY
Qty: 7 TABLET | Refills: 0 | Status: SHIPPED | OUTPATIENT
Start: 2022-10-24 | End: 2022-10-31

## 2022-10-24 NOTE — PROGRESS NOTES
47-year-old male     Reason for the visit is for evaluation sleep disorder.    He states that in year 2007 was diagnosed with sleep apnea.  He underwent sleep study test in was on CPAP with a full face mask for about 5 years.  After was machine stopped working he was not able to get another machine to due to insurance technicalities    Within the past year is noted that his sleep has gotten worse.  He can get up multiple times up tonight maybe 5-6.  There is no difficulty going to sleep but after 10 minutes he may wake up it is difficult to go to sleep.  Wife does not notice snoring but does no abnormal sleeping sounds and has noticed pauses in the breathing.  When he wakes up in the morning he is fatigue and tired and then in the afternoon if he is not distracted it is easy to get sleepy and somnolent.      He has gained weight within the past 2 years.  Underwent repair rotator cuff tear left shoulder January 2022      Review of symptoms  Negative for chest pain, palpitations, shortness breath, abdominal pain.  Regular bowel function.  No difficulty urinating and actually no nocturia    Examination   Weight 176   Pulse 72   Blood pressure 136/80   Neck no thyromegaly no masses   Chest clear breath sounds good effort   Heart regular rate rhythm, no murmurs gallops   Abdominal exam active bowel sounds soft nontender no hepatosplenomegaly abdominal masses   2+ carotid pulses no bruits   Extremities no edema   Nasal mucosa is clear   Oropharynx no abnormal findings     Impression   Obstructive sleep apnea     Sleep disorder Plan referred to Sleep Center may need home sleep study test   Trial trazodone 50 mg at bedtime for the next week  Labs is CBC TSH and chemistry

## 2022-10-25 LAB
ALBUMIN SERPL BCP-MCNC: 4.5 G/DL (ref 3.5–5.2)
ALP SERPL-CCNC: 54 U/L (ref 55–135)
ALT SERPL W/O P-5'-P-CCNC: 46 U/L (ref 10–44)
ANION GAP SERPL CALC-SCNC: 11 MMOL/L (ref 8–16)
AST SERPL-CCNC: 25 U/L (ref 10–40)
BASOPHILS # BLD AUTO: 0.02 K/UL (ref 0–0.2)
BASOPHILS NFR BLD: 0.3 % (ref 0–1.9)
BILIRUB SERPL-MCNC: 0.6 MG/DL (ref 0.1–1)
BUN SERPL-MCNC: 11 MG/DL (ref 6–20)
CALCIUM SERPL-MCNC: 10 MG/DL (ref 8.7–10.5)
CHLORIDE SERPL-SCNC: 104 MMOL/L (ref 95–110)
CO2 SERPL-SCNC: 25 MMOL/L (ref 23–29)
CREAT SERPL-MCNC: 1.2 MG/DL (ref 0.5–1.4)
DIFFERENTIAL METHOD: NORMAL
EOSINOPHIL # BLD AUTO: 0.1 K/UL (ref 0–0.5)
EOSINOPHIL NFR BLD: 1.5 % (ref 0–8)
ERYTHROCYTE [DISTWIDTH] IN BLOOD BY AUTOMATED COUNT: 12.3 % (ref 11.5–14.5)
EST. GFR  (NO RACE VARIABLE): >60 ML/MIN/1.73 M^2
GLUCOSE SERPL-MCNC: 87 MG/DL (ref 70–110)
HCT VFR BLD AUTO: 45.3 % (ref 40–54)
HGB BLD-MCNC: 14.9 G/DL (ref 14–18)
IMM GRANULOCYTES # BLD AUTO: 0.02 K/UL (ref 0–0.04)
IMM GRANULOCYTES NFR BLD AUTO: 0.3 % (ref 0–0.5)
LYMPHOCYTES # BLD AUTO: 2.7 K/UL (ref 1–4.8)
LYMPHOCYTES NFR BLD: 36.1 % (ref 18–48)
MCH RBC QN AUTO: 30.2 PG (ref 27–31)
MCHC RBC AUTO-ENTMCNC: 32.9 G/DL (ref 32–36)
MCV RBC AUTO: 92 FL (ref 82–98)
MONOCYTES # BLD AUTO: 0.6 K/UL (ref 0.3–1)
MONOCYTES NFR BLD: 8 % (ref 4–15)
NEUTROPHILS # BLD AUTO: 4 K/UL (ref 1.8–7.7)
NEUTROPHILS NFR BLD: 53.8 % (ref 38–73)
NRBC BLD-RTO: 0 /100 WBC
PLATELET # BLD AUTO: 228 K/UL (ref 150–450)
PMV BLD AUTO: 9.6 FL (ref 9.2–12.9)
POTASSIUM SERPL-SCNC: 4.7 MMOL/L (ref 3.5–5.1)
PROT SERPL-MCNC: 7.4 G/DL (ref 6–8.4)
RBC # BLD AUTO: 4.94 M/UL (ref 4.6–6.2)
SODIUM SERPL-SCNC: 140 MMOL/L (ref 136–145)
TSH SERPL DL<=0.005 MIU/L-ACNC: 2.63 UIU/ML (ref 0.4–4)
WBC # BLD AUTO: 7.35 K/UL (ref 3.9–12.7)

## 2022-11-04 ENCOUNTER — OFFICE VISIT (OUTPATIENT)
Dept: SLEEP MEDICINE | Facility: CLINIC | Age: 47
End: 2022-11-04
Payer: COMMERCIAL

## 2022-11-04 VITALS
DIASTOLIC BLOOD PRESSURE: 86 MMHG | HEART RATE: 68 BPM | BODY MASS INDEX: 29.95 KG/M2 | WEIGHT: 227 LBS | SYSTOLIC BLOOD PRESSURE: 138 MMHG

## 2022-11-04 DIAGNOSIS — G47.30 SLEEP APNEA, UNSPECIFIED TYPE: Primary | ICD-10-CM

## 2022-11-04 DIAGNOSIS — G47.9 SLEEP DISORDER: ICD-10-CM

## 2022-11-04 DIAGNOSIS — G47.33 OSA (OBSTRUCTIVE SLEEP APNEA): ICD-10-CM

## 2022-11-04 PROCEDURE — 99999 PR PBB SHADOW E&M-EST. PATIENT-LVL III: CPT | Mod: PBBFAC,,, | Performed by: NURSE PRACTITIONER

## 2022-11-04 PROCEDURE — 3008F BODY MASS INDEX DOCD: CPT | Mod: CPTII,S$GLB,, | Performed by: NURSE PRACTITIONER

## 2022-11-04 PROCEDURE — 3008F PR BODY MASS INDEX (BMI) DOCUMENTED: ICD-10-PCS | Mod: CPTII,S$GLB,, | Performed by: NURSE PRACTITIONER

## 2022-11-04 PROCEDURE — 3079F DIAST BP 80-89 MM HG: CPT | Mod: CPTII,S$GLB,, | Performed by: NURSE PRACTITIONER

## 2022-11-04 PROCEDURE — 99203 PR OFFICE/OUTPT VISIT, NEW, LEVL III, 30-44 MIN: ICD-10-PCS | Mod: S$GLB,,, | Performed by: NURSE PRACTITIONER

## 2022-11-04 PROCEDURE — 1159F PR MEDICATION LIST DOCUMENTED IN MEDICAL RECORD: ICD-10-PCS | Mod: CPTII,S$GLB,, | Performed by: NURSE PRACTITIONER

## 2022-11-04 PROCEDURE — 1159F MED LIST DOCD IN RCRD: CPT | Mod: CPTII,S$GLB,, | Performed by: NURSE PRACTITIONER

## 2022-11-04 PROCEDURE — 3075F PR MOST RECENT SYSTOLIC BLOOD PRESS GE 130-139MM HG: ICD-10-PCS | Mod: CPTII,S$GLB,, | Performed by: NURSE PRACTITIONER

## 2022-11-04 PROCEDURE — 99999 PR PBB SHADOW E&M-EST. PATIENT-LVL III: ICD-10-PCS | Mod: PBBFAC,,, | Performed by: NURSE PRACTITIONER

## 2022-11-04 PROCEDURE — 3075F SYST BP GE 130 - 139MM HG: CPT | Mod: CPTII,S$GLB,, | Performed by: NURSE PRACTITIONER

## 2022-11-04 PROCEDURE — 99203 OFFICE O/P NEW LOW 30 MIN: CPT | Mod: S$GLB,,, | Performed by: NURSE PRACTITIONER

## 2022-11-04 PROCEDURE — 3079F PR MOST RECENT DIASTOLIC BLOOD PRESSURE 80-89 MM HG: ICD-10-PCS | Mod: CPTII,S$GLB,, | Performed by: NURSE PRACTITIONER

## 2022-11-04 NOTE — PROGRESS NOTES
Referred by Dr. Cantor  CHIEF COMPLAINT: CHAO    HISTORY OF PRESENT ILLNESS: He was diagnosed with CHAO by PSG at center in [place] 2008. No records. Used cpap ? Pressure ~ 1 yr but mask was very bulky and would remove it constantly throughout the nigth. Machine gave out and stopped use and just dealt with his poor sleep. Since ~15# gain the past year due to inactivity/shoulder surgery his symptoms are worse. +snoring and +witnessed apneic pauses. 7+x disrupted sleep. +excessive daytime sleeepiness. Trazadone 50mg taken last week helped reduce disruptions down to ~ 2-3x but no improvement in daytime sleepiness.   Seasonal sinus      On todays [place] Sleepiness Scale the patient scores a 19/24.     FAMILY HISTORY: No known sleep disorders.   SOCIAL HISTORY: . sales      EXAM:   /86   Pulse 68   Wt 103 kg (227 lb)   BMI 29.95 kg/m²     PSg 2008, N/A    ASSESSMENT:   Unspecified Sleep Apnea (no outside records), hx cpap intolerability/mask interface problems causing mask removal. Has ongoing symptoms      PLAN:   1. Home Sleep Study, discussed plan of care (if + plan apap/nose with close adherence monitoring)   2. Discussed etiology of CHAO and potential ramifications of untreated CHAO, including stroke, heart disease, HTN.  We discussed potential treatment options, which could include continuous positive airway pressure (CPAP-definitive), or referral for surgical consideration.   Avoid driving sleepy    Thank you for allowing me the opportunity to participate in the care of your patient

## 2022-11-07 ENCOUNTER — TELEPHONE (OUTPATIENT)
Dept: SLEEP MEDICINE | Facility: OTHER | Age: 47
End: 2022-11-07
Payer: COMMERCIAL

## 2022-11-10 ENCOUNTER — TELEPHONE (OUTPATIENT)
Dept: SLEEP MEDICINE | Facility: OTHER | Age: 47
End: 2022-11-10
Payer: COMMERCIAL

## 2022-11-11 ENCOUNTER — HOSPITAL ENCOUNTER (OUTPATIENT)
Dept: SLEEP MEDICINE | Facility: OTHER | Age: 47
Discharge: HOME | End: 2022-11-11
Attending: NURSE PRACTITIONER
Payer: COMMERCIAL

## 2022-11-11 DIAGNOSIS — G47.30 SLEEP APNEA, UNSPECIFIED TYPE: ICD-10-CM

## 2022-11-11 PROCEDURE — 95800 SLP STDY UNATTENDED: CPT

## 2022-11-14 PROBLEM — G47.30 SLEEP APNEA: Status: ACTIVE | Noted: 2022-11-14

## 2022-11-18 ENCOUNTER — PATIENT MESSAGE (OUTPATIENT)
Dept: SLEEP MEDICINE | Facility: CLINIC | Age: 47
End: 2022-11-18
Payer: COMMERCIAL

## 2022-11-18 DIAGNOSIS — G47.33 OSA (OBSTRUCTIVE SLEEP APNEA): Primary | ICD-10-CM

## 2022-11-18 NOTE — PROCEDURES
PHYSICIAN INTERPRETATION AND COMMENTS: Findings are consistent with mild, obstructive sleep apnea (CHAO) (G47.33),  by overall AHI (apnea hypopnea index). This study was technically adequate to allow for interpretation.  CLINICAL HISTORY: 47 year old male presented with: 16 inch neck, BMI of 29.9, an [place] sleepiness score of 20, history of  a previous diagnosis of CHAO and symptoms of nocturnal snoring, waking up choking and witnessed apneas. Based on the  clinical history, the patient has a high pre-test probability of having Moderate CHAO.  SLEEP STUDY FINDINGS: Patient underwent a 1 night Home Sleep Test and by behavioral criteria, slept for approximately  5.03 hours, with a sleep latency of 28 minutes and a sleep efficiency of 71.9%. Mild sleep disordered breathing (AHI=8) is  noted based on a 4% hypopnea desaturation criteria, predominantly in the supine position (19 events/hour). The patient  slept supine 10.4% of the night based on valid recording time of 5.03 hours and is 3.2 times as likely to have  apneas/hypopneas when supine. When considering more subtle measures of sleep disordered breathing, the overall  respiratory disturbance index is mild(RDI=12) based on a 1% hypopnea desaturation criteria with confirmation by  surrogate arousal indicators. The apneas/hypopneas are accompanied by severe oxygen desaturation (percent time below  90% SpO2: 59.3%, Min SpO2: 71.7%). The average desaturation across all sleep disordered breathing events is 3.5%. Snoring  occurs for 0.2% (30 dB) of the study. The mean pulse rate is 55.5 BPM, with frequent pulse rate variability (54 events with >=  6 BPM increase/decrease per hour).  TREATMENT CONSIDERATIONS: Consider trial of Auto-titrating CPAP 6-20 cm, mask of patient's choice, and heated  humidification. If patient has difficulty with CPAP adherence or ongoing CHAO symptoms or despite CPAP adherence, then  consider an in-lab titration sleep study in order to determine  optimal fixed CPAP setting. Alternatively consider oral  appliance fitted by a dentist specializing in these devices, or surgical consultation for uvulopalatopharyngoplasty (UPPP)  for treatment of obstructive sleep apnea.  DISEASE MANAGEMENT CONSIDERATIONS: Definitive treatment for CHAO is recommended. Consider Sleep Clinic referral for  CHAO management.  Signature:

## 2022-11-21 ENCOUNTER — PATIENT MESSAGE (OUTPATIENT)
Dept: ADMINISTRATIVE | Facility: HOSPITAL | Age: 47
End: 2022-11-21
Payer: COMMERCIAL

## 2023-01-17 ENCOUNTER — OFFICE VISIT (OUTPATIENT)
Dept: SPORTS MEDICINE | Facility: CLINIC | Age: 48
End: 2023-01-17
Payer: COMMERCIAL

## 2023-01-17 VITALS
DIASTOLIC BLOOD PRESSURE: 89 MMHG | HEART RATE: 84 BPM | WEIGHT: 233.81 LBS | SYSTOLIC BLOOD PRESSURE: 127 MMHG | BODY MASS INDEX: 30.99 KG/M2 | HEIGHT: 73 IN

## 2023-01-17 DIAGNOSIS — M25.512 LEFT SHOULDER PAIN, UNSPECIFIED CHRONICITY: Primary | ICD-10-CM

## 2023-01-17 DIAGNOSIS — Z98.890 S/P ROTATOR CUFF SURGERY: ICD-10-CM

## 2023-01-17 PROCEDURE — 99213 OFFICE O/P EST LOW 20 MIN: CPT | Mod: S$GLB,,, | Performed by: PHYSICIAN ASSISTANT

## 2023-01-17 PROCEDURE — 3008F PR BODY MASS INDEX (BMI) DOCUMENTED: ICD-10-PCS | Mod: CPTII,S$GLB,, | Performed by: PHYSICIAN ASSISTANT

## 2023-01-17 PROCEDURE — 3074F SYST BP LT 130 MM HG: CPT | Mod: CPTII,S$GLB,, | Performed by: PHYSICIAN ASSISTANT

## 2023-01-17 PROCEDURE — 99999 PR PBB SHADOW E&M-EST. PATIENT-LVL III: CPT | Mod: PBBFAC,,, | Performed by: PHYSICIAN ASSISTANT

## 2023-01-17 PROCEDURE — 99213 PR OFFICE/OUTPT VISIT, EST, LEVL III, 20-29 MIN: ICD-10-PCS | Mod: S$GLB,,, | Performed by: PHYSICIAN ASSISTANT

## 2023-01-17 PROCEDURE — 99999 PR PBB SHADOW E&M-EST. PATIENT-LVL III: ICD-10-PCS | Mod: PBBFAC,,, | Performed by: PHYSICIAN ASSISTANT

## 2023-01-17 PROCEDURE — 3008F BODY MASS INDEX DOCD: CPT | Mod: CPTII,S$GLB,, | Performed by: PHYSICIAN ASSISTANT

## 2023-01-17 PROCEDURE — 1159F MED LIST DOCD IN RCRD: CPT | Mod: CPTII,S$GLB,, | Performed by: PHYSICIAN ASSISTANT

## 2023-01-17 PROCEDURE — 3079F DIAST BP 80-89 MM HG: CPT | Mod: CPTII,S$GLB,, | Performed by: PHYSICIAN ASSISTANT

## 2023-01-17 PROCEDURE — 3074F PR MOST RECENT SYSTOLIC BLOOD PRESSURE < 130 MM HG: ICD-10-PCS | Mod: CPTII,S$GLB,, | Performed by: PHYSICIAN ASSISTANT

## 2023-01-17 PROCEDURE — 1159F PR MEDICATION LIST DOCUMENTED IN MEDICAL RECORD: ICD-10-PCS | Mod: CPTII,S$GLB,, | Performed by: PHYSICIAN ASSISTANT

## 2023-01-17 PROCEDURE — 1160F RVW MEDS BY RX/DR IN RCRD: CPT | Mod: CPTII,S$GLB,, | Performed by: PHYSICIAN ASSISTANT

## 2023-01-17 PROCEDURE — 1160F PR REVIEW ALL MEDS BY PRESCRIBER/CLIN PHARMACIST DOCUMENTED: ICD-10-PCS | Mod: CPTII,S$GLB,, | Performed by: PHYSICIAN ASSISTANT

## 2023-01-17 PROCEDURE — 3079F PR MOST RECENT DIASTOLIC BLOOD PRESSURE 80-89 MM HG: ICD-10-PCS | Mod: CPTII,S$GLB,, | Performed by: PHYSICIAN ASSISTANT

## 2023-01-19 NOTE — PROGRESS NOTES
"CC left shoulder pain    HISTORY OF PRESENT ILLNESS:   Pt is here today for post-operative followup of his shoulder arthroscopy.  he is doing well.  We have reviewed his findings and discussed plan of care and future treatment options.                         Patient has completed physical therapy at the [facility] location, worked with Monika CARBALLO.    Patient notes he is doing well and has no issues or concerns.   Shoulder feels great!            SANE 1/19/2023: "[id number]"  SANE post op: 30  SANE pre op: 35        DATE OF PROCEDURE: 01/04/2022  SURGEON: Marly Heart M.D.  OPERATION:   left  1. Shoulder arthroscopic rotator cuff repair (upper 1/8 subscapularis) with Cuff Mend ()  2. Shoulder arthroscopic biceps tenodesis (CPT 60522)  3. Shoulder arthroscopic subacromial decompression, bursectomy   4. Shoulder arthroscopic extensive debridement (anterior, posterior glenohumeral joint, subacromial space) (CPT 19732)  5. Shoulder arthroscopic labral debridement (CPT 07494)  6. Shoulder arthroscopic lysis of adhesions (CPT 95594)  7. Shoulder arthroscopic loose body removal    There was chondral damage to:  Humeral head: 20 x 15 mm grade 3  Glenoid: 5 x 15 mm grade 3  Chondroplasty was performed using arthroscopic shaver.     Review of Systems   Constitution: Negative. Negative for chills, fever and night sweats.   HENT: Negative for congestion and headaches.    Eyes: Negative for blurred vision, left vision loss and right vision loss.   Cardiovascular: Negative for chest pain and syncope.   Respiratory: Negative for cough and shortness of breath.    Endocrine: Negative for polydipsia, polyphagia and polyuria.   Hematologic/Lymphatic: Negative for bleeding problem. Does not bruise/bleed easily.   Skin: Negative for dry skin, itching and rash.   Musculoskeletal: Negative for falls and muscle weakness.   Gastrointestinal: Negative for abdominal pain and bowel incontinence.   Genitourinary: Negative for bladder " incontinence and nocturia.   Neurological: Negative for disturbances in coordination, loss of balance and seizures.   Psychiatric/Behavioral: Negative for depression. The patient does not have insomnia.    Allergic/Immunologic: Negative for hives and persistent infections.                                              PHYSICAL EXAMINATION:     Incision sites healed well  No evidence of any erythema, infection or induration  elbow Range of motion full   No effusion  2+ DP pulse  No swelling       Forward Flexion: 175  ER: 55  IR: T10    Strength at 0 and 30: 5/5  ER: 5/5                                                                            ASSESSMENT:                                                                                                                                               1. Status post above, doing well.     Left shoulder pain                                                                                                                            PLAN:                                                                                                                                                     1. Resume activities as tolerated. Recommended occasionally revisiting shoulder HEP for scapular dyskinesia   2. Emphasized scapular function.  3. I have discussed return to activity in detail.  4. he will see us back for yearly f/u. Put in recall.     Discussed with PT                                5. All questions were answered and he should contact us if he  has any questions or concerns in the interim.                                                         I made the decision to obtain old records of the patient including previous notes and imaging. New imaging was ordered today of the extremity or extremities evaluated. I independently reviewed and interpreted the radiographs and/or MRIs today as well as prior imaging.

## 2024-07-25 ENCOUNTER — HOSPITAL ENCOUNTER (EMERGENCY)
Facility: HOSPITAL | Age: 49
Discharge: HOME | End: 2024-07-25
Attending: EMERGENCY MEDICINE
Payer: COMMERCIAL

## 2024-07-25 VITALS
OXYGEN SATURATION: 97 % | HEART RATE: 61 BPM | RESPIRATION RATE: 16 BRPM | TEMPERATURE: 98 F | DIASTOLIC BLOOD PRESSURE: 101 MMHG | BODY MASS INDEX: 31.15 KG/M2 | SYSTOLIC BLOOD PRESSURE: 151 MMHG | WEIGHT: 230 LBS | HEIGHT: 72 IN

## 2024-07-25 DIAGNOSIS — M54.50 ACUTE EXACERBATION OF CHRONIC LOW BACK PAIN: Primary | ICD-10-CM

## 2024-07-25 DIAGNOSIS — G89.29 ACUTE EXACERBATION OF CHRONIC LOW BACK PAIN: Primary | ICD-10-CM

## 2024-07-25 DIAGNOSIS — M51.36 DDD (DEGENERATIVE DISC DISEASE), LUMBAR: ICD-10-CM

## 2024-07-25 DIAGNOSIS — R03.0 ELEVATED BLOOD PRESSURE READING WITHOUT DIAGNOSIS OF HYPERTENSION: ICD-10-CM

## 2024-07-25 LAB
BILIRUB UR QL STRIP: NEGATIVE
CLARITY UR REFRACT.AUTO: CLEAR
COLOR UR AUTO: YELLOW
GLUCOSE UR QL STRIP: NEGATIVE
HGB UR QL STRIP: NEGATIVE
KETONES UR QL STRIP: NEGATIVE
LEUKOCYTE ESTERASE UR QL STRIP: NEGATIVE
NITRITE UR QL STRIP: NEGATIVE
PH UR STRIP: 6 [PH] (ref 5–8)
PROT UR QL STRIP: ABNORMAL
SP GR UR STRIP: >=1.03 (ref 1–1.03)
URN SPEC COLLECT METH UR: ABNORMAL

## 2024-07-25 PROCEDURE — 99284 EMERGENCY DEPT VISIT MOD MDM: CPT | Mod: 25

## 2024-07-25 PROCEDURE — 63600175 PHARM REV CODE 636 W HCPCS: Performed by: PHYSICIAN ASSISTANT

## 2024-07-25 PROCEDURE — 25000003 PHARM REV CODE 250: Performed by: PHYSICIAN ASSISTANT

## 2024-07-25 PROCEDURE — 96372 THER/PROPH/DIAG INJ SC/IM: CPT | Performed by: PHYSICIAN ASSISTANT

## 2024-07-25 PROCEDURE — 81003 URINALYSIS AUTO W/O SCOPE: CPT | Performed by: PHYSICIAN ASSISTANT

## 2024-07-25 RX ORDER — METHYLPREDNISOLONE SOD SUCC 125 MG
125 VIAL (EA) INJECTION
Status: COMPLETED | OUTPATIENT
Start: 2024-07-25 | End: 2024-07-25

## 2024-07-25 RX ORDER — LIDOCAINE 50 MG/G
1 PATCH TOPICAL
Status: DISCONTINUED | OUTPATIENT
Start: 2024-07-25 | End: 2024-07-25 | Stop reason: HOSPADM

## 2024-07-25 RX ORDER — METHOCARBAMOL 500 MG/1
500 TABLET, FILM COATED ORAL 3 TIMES DAILY PRN
Qty: 15 TABLET | Refills: 0 | Status: SHIPPED | OUTPATIENT
Start: 2024-07-25 | End: 2024-07-30

## 2024-07-25 RX ORDER — LIDOCAINE 50 MG/G
1 PATCH TOPICAL DAILY PRN
Qty: 14 PATCH | Refills: 0 | Status: SHIPPED | OUTPATIENT
Start: 2024-07-25

## 2024-07-25 RX ORDER — METHOCARBAMOL 500 MG/1
500 TABLET, FILM COATED ORAL
Status: COMPLETED | OUTPATIENT
Start: 2024-07-25 | End: 2024-07-25

## 2024-07-25 RX ORDER — METHYLPREDNISOLONE 4 MG/1
TABLET ORAL
Qty: 1 EACH | Refills: 0 | Status: SHIPPED | OUTPATIENT
Start: 2024-07-25 | End: 2024-08-15

## 2024-07-25 RX ORDER — KETOROLAC TROMETHAMINE 30 MG/ML
15 INJECTION, SOLUTION INTRAMUSCULAR; INTRAVENOUS
Status: COMPLETED | OUTPATIENT
Start: 2024-07-25 | End: 2024-07-25

## 2024-07-25 RX ADMIN — METHOCARBAMOL 500 MG: 500 TABLET ORAL at 09:07

## 2024-07-25 RX ADMIN — METHYLPREDNISOLONE SODIUM SUCCINATE 125 MG: 125 INJECTION, POWDER, FOR SOLUTION INTRAMUSCULAR; INTRAVENOUS at 09:07

## 2024-07-25 RX ADMIN — KETOROLAC TROMETHAMINE 15 MG: 30 INJECTION, SOLUTION INTRAMUSCULAR; INTRAVENOUS at 09:07

## 2024-07-25 RX ADMIN — LIDOCAINE 5% 1 PATCH: 700 PATCH TOPICAL at 09:07

## 2024-07-25 NOTE — Clinical Note
"Maxwell Echeverria (Jon) was seen and treated in our emergency department on 7/25/2024.  He may return to work on 07/29/2024.       If you have any questions or concerns, please don't hesitate to call.      Juan R Ramirez PA-C     "

## 2024-07-25 NOTE — ED TRIAGE NOTES
Patient arrived via personal transport for the chief complaint of back pain. Patient states he has had back pain issues intermittently since Liliya. The patient stated he thinks he aggravated his back two weeks ago however the pain has not subsided since then.

## 2024-07-25 NOTE — ED PROVIDER NOTES
"Encounter Date: 7/25/2024       History     Chief Complaint   Patient presents with    Back Pain     Pt c/o back pain.  States has intermittent back "episodes" since tamie     Patient is a 48-year-old male.  He has a past medical history of chronic low back pain and chronic shoulder pain.  He presents to the ER for an urgent evaluation complaining of an acute exacerbation of chronic low back pain.  He states that he initially injured his lower back in 2006 and was seeing a spine specialist who recommended that he have a surgical procedure versus therapy, and he elected for physical therapy.  He states that he has had ongoing chronic low back pain ever since, but that pain is usually tolerable, and he is not followed up since.  However, 2 weeks ago while doing [email or web address] work, he states that he felt increased pain to his right lower back while picking up a leaf blower.  He states that since that time he has had constant severe right-sided lower back pain.  He states that the pain does seem to go into his right upper buttock, but not down his leg.  He states that the pain is significantly worse with certain movements and positions.  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  He states that he has attempted hot showers, heating pad, over-the-counter anti-inflammatories, and leftover pain medicine from previous rotator cuff surgery, without any significant relief.  Patient states that he is concerned because his back pain has never been this severe or lasted this many days consecutively.  He denies any urinary symptoms.  He denies any numbness or weakness.  He denies any bowel or bladder dysfunction.  He denies any saddle paresthesia.  He denies any fever or chills.  He states that he is having difficulty with ambulation due to pain.  No additional symptoms or concerns reported at this time.      Review of patient's allergies indicates:  No Known " Allergies  Past Medical History:   Diagnosis Date    Low back pain     Shoulder pain      Past Surgical History:   Procedure Laterality Date    ARTHROSCOPIC DEBRIDEMENT OF SHOULDER Left 1/4/2022    Procedure: LABRUM DEBRIDEMENT, SHOULDER, ARTHROSCOPIC;  Surgeon: Marly Heart MD;  Location: [facility] OR;  Service: Orthopedics;  Laterality: Left;    ARTHROSCOPIC REMOVAL OF LOOSE BODY FROM JOINT Left 1/4/2022    Procedure: REMOVAL, LOOSE BODY, JOINT, ARTHROSCOPIC;  Surgeon: Marly Heart MD;  Location: [facility] OR;  Service: Orthopedics;  Laterality: Left;    ARTHROSCOPIC REPAIR OF ROTATOR CUFF OF SHOULDER Left 1/4/2022    Procedure: REPAIR, ROTATOR CUFF, ARTHROSCOPIC;  Surgeon: Marly Heart MD;  Location: [facility] OR;  Service: Orthopedics;  Laterality: Left;    ARTHROSCOPY OF SHOULDER WITH DECOMPRESSION OF SUBACROMIAL SPACE Left 1/4/2022    Procedure: ARTHROSCOPY, SHOULDER, WITH SUBACROMIAL SPACE DECOMPRESSION;  Surgeon: Marly Heart MD;  Location: [facility] OR;  Service: Orthopedics;  Laterality: Left;    BICEPS TENDON REPAIR Left 1/4/2022    Procedure: BICEPS TENODESIS;  Surgeon: Marly Heart MD;  Location: [facility] OR;  Service: Orthopedics;  Laterality: Left;     Family History   Problem Relation Name Age of Onset    Diabetes Father      Hypertension Father      Cancer Brother          testicular     Social History     Tobacco Use    Smoking status: Never    Smokeless tobacco: Never   Substance Use Topics    Alcohol use: Yes     Comment: 5 drinks weekend     Review of Systems   Constitutional:  Negative for chills and fever.   Respiratory:  Negative for shortness of breath.    Cardiovascular:  Negative for chest pain.   Gastrointestinal:  Negative for abdominal distention, abdominal pain, blood in stool, constipation, diarrhea, nausea and vomiting.   Genitourinary:  Negative for decreased urine volume, difficulty urinating, dysuria, flank pain, frequency, hematuria, testicular pain and urgency.   Musculoskeletal:  Positive for back pain and  gait problem. Negative for neck pain.   Skin:  Negative for color change and rash.   Allergic/Immunologic: Negative for immunocompromised state.   Neurological:  Negative for weakness, light-headedness, numbness and headaches.   Psychiatric/Behavioral:  Negative for confusion.        Physical Exam     Initial Vitals [07/25/24 0826]   BP Pulse Resp Temp SpO2   (!) 143/98 76 16 98 °F (36.7 °C) 98 %      MAP       --         Physical Exam    Nursing note and vitals reviewed.  Constitutional: He appears well-developed and well-nourished. He is not diaphoretic.   Alert and interactive.  Appears uncomfortable.  Accompanied by wife.  Ambulatory, but antalgic gait noted   HENT:   Head: Normocephalic.   Eyes: Conjunctivae are normal.   Neck: Neck supple.   Nontender   Normal range of motion.  Cardiovascular:  Normal rate and intact distal pulses.           Pulmonary/Chest: No respiratory distress.   Abdominal: Abdomen is soft. He exhibits no distension. There is no abdominal tenderness. There is no rebound and no guarding.   Musculoskeletal:         General: Normal range of motion.      Cervical back: Normal range of motion and neck supple.      Comments: Diffuse right lumbar paraspinal tenderness to palpation.  No midline vertebral point tenderness to palpation.     Neurological: He is alert and oriented to person, place, and time. He has normal strength. No sensory deficit.   5/5 strength and sensation extremities x4.   Skin: Skin is warm and dry. No rash noted.   Psychiatric: He has a normal mood and affect. His behavior is normal.         ED Course   Procedures  Labs Reviewed   URINALYSIS, REFLEX TO URINE CULTURE - Abnormal       Result Value    Specimen UA Urine, Clean Catch      Color, UA Yellow      Appearance, UA Clear      pH, UA 6.0      Specific Gravity, UA >=1.030 (*)     Protein, UA Trace (*)     Glucose, UA Negative      Ketones, UA Negative      Bilirubin (UA) Negative      Occult Blood UA Negative       Nitrite, UA Negative      Leukocytes, UA Negative      Narrative:     Specimen Source->Urine          Imaging Results              X-Ray Lumbar Spine Ap And Lateral (Final result)  Result time 07/25/24 09:35:16      Final result by Doni Turk MD (07/25/24 09:35:16)                   Impression:      1. Right L4-5 facet arthropathy.  2. L5-S1 disc narrowing.  Minor multilevel marginal vertebral endplate spurring at several thoracolumbar levels.  3. No evidence of compression fracture.      Electronically signed by: Doni Turk MD  Date:    07/25/2024  Time:    09:35               Narrative:    EXAMINATION:  XR LUMBAR SPINE AP AND LATERAL    CLINICAL HISTORY:  low back pain;    TECHNIQUE:  Three views were obtained, with AP, lateral, and lumbosacral lateral projections submitted.    COMPARISON:  No relevant comparison examinations are currently available.  Clinical information obtained from the electronic medical record indicates recent exacerbation of chronic back pain.    FINDINGS:  No significant alignment abnormality.  Vertebral body heights are normally maintained, without compression deformity at any level.  Some L5-S1 disc narrowing is observed, with the other lumbar discs adequately maintained in height.  Facet arthropathy is noted at L4-5 on the right side.  Minimal vertebral endplate spurring is seen at several thoracolumbar levels.  Vertebral endplates are well defined.  No osseous destructive process.  SI joints appear unremarkable.                                       Medications   LIDOcaine 5 % patch 1 patch (1 patch Transdermal Patch Applied 7/25/24 0902)   ketorolac injection 15 mg (15 mg Intramuscular Given 7/25/24 0902)   methocarbamoL tablet 500 mg (500 mg Oral Given 7/25/24 0902)   methylPREDNISolone sodium succinate injection 125 mg (125 mg Intramuscular Given 7/25/24 0956)     Medical Decision Making  Amount and/or Complexity of Data Reviewed  Radiology: ordered.    Risk  Prescription drug  management.                          Medical Decision Making:   Initial Assessment:   48-year-old male presents to the ER for an emergent evaluation complaining of an acute exacerbation of chronic low back pain.  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  Patient reports constant pain ever since.  Patient reports pain radiating to right upper buttock but, but not down leg.  Patient reports pain similar to previous, however more severe with longer duration.  Unrelieved with attempted home therapy.  Differential Diagnosis:   Acute exacerbation of chronic low back pain, sciatica, sacroiliitis, lumbar strain, lumbar strain, lumbago, lumbar radiculopathy, spinal stenosis, degenerative disc disease, herniated disc, bulging disc, spondylosis, spondylolisthesis, compression fracture, subluxation, etc.   I considered but have low suspicion for cauda equina, epidural abscess, or diskitis based on history and examination.  Clinical Tests:   Lab Tests: Ordered and Reviewed  Radiological Study: Ordered and Reviewed  ED Management:  Vital signs reviewed, afebrile, mildly elevated blood pressure reading noted, no tachycardia or tachypnea   Chart review completed, no previous spine imaging on record.  Patient states that he has had previous lumbar MRI but this was done prior to 2010 at an not [facility] facility.  Records not available   Urinalysis completed, unremarkable  Lumbar radiographs completed, chronic degenerative changes   Patient was given IM Toradol, Lidoderm topical patch to right lower back, and PO Robaxin 500 mg tablet   On reassessment, patient reports feeling moderately improved after treatment  Pt requesting treatment with corticosteroids, IM Solumedrol administered   Ambulatory referral to spine clinic order placed - patient advised to follow up at the next available appointment in spine clinic for re-evaluation and further management.  Outpatient MRI recommended.   Prescriptions for methylprednisolone, Lidoderm, and Robaxin were provided.  Patient instructed to follow up with his primary care physician in the next 1-2 days for re-evaluation further management   Patient instructed to return to the ER promptly if unimproved or if symptoms worse in any way   Patient verbalized understanding and comfort with plan.             Clinical Impression:  Final diagnoses:  [M54.50, G89.29] Acute exacerbation of chronic low back pain (Primary)  [M51.36] DDD (degenerative disc disease), lumbar  [R03.0] Elevated blood pressure reading without diagnosis of hypertension          ED Disposition Condition    Discharge Stable          ED Prescriptions       Medication Sig Dispense Start Date End Date Auth. Provider    methylPREDNISolone (MEDROL DOSEPACK) 4 mg tablet use as directed 1 each 7/25/2024 8/15/2024 Juan R Ramirez PA-C    methocarbamoL (ROBAXIN) 500 MG Tab Take 1 tablet (500 mg total) by mouth 3 (three) times daily as needed (muscle relaxer). 15 tablet 7/25/2024 7/30/2024 Juan R Ramirez PA-C    LIDOcaine (LIDODERM) 5 % Place 1 patch onto the skin daily as needed (pain). Remove & Discard patch within 12 hours or as directed by MD 14 patch 7/25/2024 -- Juan R Ramirez PA-C          Follow-up Information       Follow up With Specialties Details Why Contact Info Additional Information    Karson Cantor MD Internal Medicine Schedule an appointment as soon as possible for a visit in 2 days  [address] [facility], [address]  [facility] [id number] [phone]       [facility] [facility] Schedule an appointment as soon as possible for a visit  Follow up with spine clinic at next available. Outpatient MRI is recommended. [address] [facility] [address] [phone] [facility] - Main Building, 5th Floor Please park in [facility] and use Atrium elevator    [facility] - Emergency Dept Emergency Medicine   Return to the ER if unimproved or if worse in any way. [address]  [facility] [id number] [phone]              Juan R Ramirez PAABHI  07/25/24 1022

## 2024-07-29 ENCOUNTER — TELEPHONE (OUTPATIENT)
Dept: ORTHOPEDICS | Facility: CLINIC | Age: 49
End: 2024-07-29
Payer: COMMERCIAL

## 2024-07-29 DIAGNOSIS — M54.50 LUMBAR PAIN: Primary | ICD-10-CM

## 2024-07-29 NOTE — TELEPHONE ENCOUNTER
Spoke with pt. Pt is aware of x-ray at imaging center for 7/30/24. Patient states verbal understanding and has no further questions.

## 2024-07-30 ENCOUNTER — HOSPITAL ENCOUNTER (OUTPATIENT)
Dept: RADIOLOGY | Facility: HOSPITAL | Age: 49
Discharge: HOME | End: 2024-07-30
Attending: PHYSICIAN ASSISTANT
Payer: COMMERCIAL

## 2024-07-30 ENCOUNTER — OFFICE VISIT (OUTPATIENT)
Dept: ORTHOPEDICS | Facility: CLINIC | Age: 49
End: 2024-07-30
Payer: COMMERCIAL

## 2024-07-30 VITALS — BODY MASS INDEX: 30.97 KG/M2 | WEIGHT: 228.63 LBS | HEIGHT: 72 IN

## 2024-07-30 DIAGNOSIS — G89.29 ACUTE EXACERBATION OF CHRONIC LOW BACK PAIN: ICD-10-CM

## 2024-07-30 DIAGNOSIS — M54.50 ACUTE EXACERBATION OF CHRONIC LOW BACK PAIN: ICD-10-CM

## 2024-07-30 DIAGNOSIS — M54.50 LUMBAR PAIN: ICD-10-CM

## 2024-07-30 DIAGNOSIS — M51.36 DDD (DEGENERATIVE DISC DISEASE), LUMBAR: ICD-10-CM

## 2024-07-30 PROCEDURE — 1159F MED LIST DOCD IN RCRD: CPT | Mod: CPTII,S$GLB,, | Performed by: PHYSICIAN ASSISTANT

## 2024-07-30 PROCEDURE — 99204 OFFICE O/P NEW MOD 45 MIN: CPT | Mod: S$GLB,,, | Performed by: PHYSICIAN ASSISTANT

## 2024-07-30 PROCEDURE — 72110 X-RAY EXAM L-2 SPINE 4/>VWS: CPT | Mod: TC

## 2024-07-30 PROCEDURE — 99999 PR PBB SHADOW E&M-EST. PATIENT-LVL III: CPT | Mod: PBBFAC,,, | Performed by: PHYSICIAN ASSISTANT

## 2024-07-30 PROCEDURE — 72110 X-RAY EXAM L-2 SPINE 4/>VWS: CPT | Mod: 26,,, | Performed by: INTERNAL MEDICINE

## 2024-07-30 PROCEDURE — 3008F BODY MASS INDEX DOCD: CPT | Mod: CPTII,S$GLB,, | Performed by: PHYSICIAN ASSISTANT

## 2024-07-30 RX ORDER — MELOXICAM 15 MG/1
15 TABLET ORAL DAILY
Qty: 30 TABLET | Refills: 0 | Status: SHIPPED | OUTPATIENT
Start: 2024-07-30 | End: 2024-08-29

## 2024-07-30 NOTE — PROGRESS NOTES
DATE: 7/30/2024  PATIENT: Maxwell Echeverria    Supervising Physician: Jimenez Worrell M.D.    CHIEF COMPLAINT: back pain    HISTORY:  Maxwell Echeverria is a 48 y.o. male with PMH of chronic low back pain here for initial evaluation of low back pain (Back - 4, Leg - 0). 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 The patient describes the pain as sharp.  The pain is worse with bending, lifting and doing anything too long and improved by getting up and walking or sitting down. There is no associated numbness and tingling. There is no subjective weakness. Prior treatments have included a medrol dose pack, robaxin and PT in 2006, but no recent PT, ESIs or surgery.    The patient denies myelopathic symptoms such as handwriting changes or difficulty with buttons/coins/keys. Denies perineal paresthesias, bowel/bladder dysfunction.    PAST MEDICAL/SURGICAL HISTORY:  Past Medical History:   Diagnosis Date    Low back pain     Shoulder pain      Past Surgical History:   Procedure Laterality Date    ARTHROSCOPIC DEBRIDEMENT OF SHOULDER Left 1/4/2022    Procedure: LABRUM DEBRIDEMENT, SHOULDER, ARTHROSCOPIC;  Surgeon: Marly Heart MD;  Location: [facility] OR;  Service: Orthopedics;  Laterality: Left;    ARTHROSCOPIC REMOVAL OF LOOSE BODY FROM JOINT Left 1/4/2022    Procedure: REMOVAL, LOOSE BODY, JOINT, ARTHROSCOPIC;  Surgeon: Marly Heart MD;  Location: [facility] OR;  Service: Orthopedics;  Laterality: Left;    ARTHROSCOPIC REPAIR OF ROTATOR CUFF OF SHOULDER Left 1/4/2022    Procedure: REPAIR, ROTATOR CUFF, ARTHROSCOPIC;  Surgeon: Marly Heart MD;  Location: [facility] OR;  Service: Orthopedics;  Laterality: Left;    ARTHROSCOPY OF SHOULDER WITH DECOMPRESSION OF SUBACROMIAL SPACE Left 1/4/2022    Procedure: ARTHROSCOPY, SHOULDER, WITH SUBACROMIAL SPACE DECOMPRESSION;  Surgeon: Marly Heart MD;   Location: [facility] OR;  Service: Orthopedics;  Laterality: Left;    BICEPS TENDON REPAIR Left 1/4/2022    Procedure: BICEPS TENODESIS;  Surgeon: Marly Heart MD;  Location: [facility] OR;  Service: Orthopedics;  Laterality: Left;       Medications:   Current Outpatient Medications on File Prior to Visit   Medication Sig Dispense Refill    LIDOcaine (LIDODERM) 5 % Place 1 patch onto the skin daily as needed (pain). Remove & Discard patch within 12 hours or as directed by MD 14 patch 0    methocarbamoL (ROBAXIN) 500 MG Tab Take 1 tablet (500 mg total) by mouth 3 (three) times daily as needed (muscle relaxer). 15 tablet 0    methylPREDNISolone (MEDROL DOSEPACK) 4 mg tablet use as directed 1 each 0    traZODone (DESYREL) 50 MG tablet Take 1 tablet (50 mg total) by mouth every evening. for 7 days 7 tablet 0     No current facility-administered medications on file prior to visit.       Social History:   Social History     Socioeconomic History    Marital status:    Tobacco Use    Smoking status: Never    Smokeless tobacco: Never   Substance and Sexual Activity    Alcohol use: Yes     Comment: 5 drinks weekend     Social Determinants of Health     Financial Resource Strain: Low Risk  (7/29/2024)    Overall Financial Resource Strain (CARDIA)     Difficulty of Paying Living Expenses: Not hard at all   Food Insecurity: No Food Insecurity (7/29/2024)    Hunger Vital Sign     Worried About Running Out of Food in the Last Year: Never true     Ran Out of Food in the Last Year: Never true   Physical Activity: Insufficiently Active (7/29/2024)    Exercise Vital Sign     Days of Exercise per Week: 4 days     Minutes of Exercise per Session: 30 min   Stress: No Stress Concern Present (7/29/2024)    [employer] - Occupational Stress Questionnaire     Feeling of Stress : Only a little   Housing Stability: Unknown (7/29/2024)    Housing Stability Vital Sign     Unable to Pay for Housing in the Last Year: No        REVIEW OF SYSTEMS:  Constitution: Negative. Negative for chills, fever and night sweats.   Cardiovascular: Negative for chest pain and syncope.   Respiratory: Negative for cough and shortness of breath.   Gastrointestinal: See HPI. Negative for nausea/vomiting. Negative for abdominal pain.  Genitourinary: See HPI. Negative for discoloration or dysuria.  Skin: Negative for dry skin, itching and rash.   Hematologic/Lymphatic: Negative for bleeding problem. Does not bruise/bleed easily.   Musculoskeletal: Negative for falls and muscle weakness.   Neurological: See HPI. No seizures.   Endocrine: Negative for polydipsia, polyphagia and polyuria.   Allergic/Immunologic: Negative for hives and persistent infections.     EXAM:  Ht 6' (1.829 m)   Wt 103.7 kg (228 lb 9.9 oz)   BMI 31.01 kg/m²     General: The patient is a very pleasant 48 y.o. male in no apparent distress, the patient is oriented to person, place and time.  Psych: Normal mood and affect  HEENT: Vision grossly intact, hearing intact to the spoken word.  Lungs: Respirations unlabored.  Gait: Normal station and gait, no difficulty with toe or heel walk.   Skin: Dorsal lumbar skin negative for rashes, lesions, hairy patches and surgical scars. There is mild lumbar tenderness to palpation.  Range of motion: Lumbar range of motion is acceptable.  Spinal Balance: Global saggital and coronal spinal balance acceptable, not significant for scoliosis and kyphosis.  Musculoskeletal: No pain with the range of motion of the bilateral hips. No trochanteric tenderness to palpation.  Vascular: Bilateral lower extremities warm and well perfused, dorsalis pedis pulses 2+ bilaterally.  Neurological: Normal strength and tone in all major motor groups in the bilateral lower extremities. Normal sensation to light touch in the L2-S1 dermatomes bilaterally.  Deep tendon reflexes symmetric 2+ in the bilateral lower extremities.  Negative Babinski bilaterally. Straight leg  "raise positive bilaterally, reproduces back pain.    IMAGING:      Today I personally reviewed AP, Lat and Flex/Ex  upright L-spine films that demonstrate L5/S1 disc degeneration. L4/5 anterolisthesis.       Body mass index is 31.01 kg/m².    No results found for: "HGBA1C"        ASSESSMENT/PLAN:    Maxwell was seen today for low-back pain.    Diagnoses and all orders for this visit:    Acute exacerbation of chronic low back pain  -     Ambulatory referral/consult to [facility]  -     Ambulatory referral/consult to Physical/Occupational Therapy; Future    DDD (degenerative disc disease), lumbar  -     Ambulatory referral/consult to [facility]  -     Ambulatory referral/consult to Physical/Occupational Therapy; Future    Other orders  -     meloxicam (MOBIC) 15 MG tablet; Take 1 tablet (15 mg total) by mouth once daily.        Today we discussed at length all of the different treatment options including anti-inflammatories, acetaminophen, rest, ice, heat, physical therapy including strengthening and stretching exercises, home exercises, ROM, aerobic conditioning, aqua therapy, other modalities including ultrasound, massage, and dry needling, epidural steroid injections and finally surgical intervention.      The patient would like to try physical therapy first.  Mobic sent to the pharmacy as well.  Follow up after therapy if symptoms persist.  We will consider an MRI at that time.       "

## 2024-11-07 ENCOUNTER — PATIENT OUTREACH (OUTPATIENT)
Dept: ADMINISTRATIVE | Facility: HOSPITAL | Age: 49
End: 2024-11-07
Payer: COMMERCIAL

## 2024-11-07 NOTE — PROGRESS NOTES
No answer. Will call back next week to schedule appts.  Elva Larson LPN   Clinical Care Coordinator  [facility]

## 2025-05-22 ENCOUNTER — TELEPHONE (OUTPATIENT)
Dept: INTERNAL MEDICINE | Facility: CLINIC | Age: 50
End: 2025-05-22
Payer: COMMERCIAL

## (undated) DEVICE — UNDERGLOVES BIOGEL PI SIZE 7.5

## (undated) DEVICE — CANNULA TWIST IN 7MM X 7CM

## (undated) DEVICE — NDL 18GA X1 1/2 REG BEVEL

## (undated) DEVICE — BNDG COFLEX FOAM LF2 ST 4X5YD

## (undated) DEVICE — SYR IRRIGATION BULB STER 60ML

## (undated) DEVICE — NDL SCORPIAN SUTURE PASSER

## (undated) DEVICE — GAUZE SPONGE 4X4 12PLY

## (undated) DEVICE — Device

## (undated) DEVICE — GLOVE ORTHO PF SZ 8.5

## (undated) DEVICE — CANNULA ARTHRO NON THREAD

## (undated) DEVICE — SHAVER ULTRAFFR 4.2MM

## (undated) DEVICE — POSITIONER IV ARMBOARD FOAM

## (undated) DEVICE — ADHESIVE MASTISOL VIAL 48/BX

## (undated) DEVICE — KIT SHOULDER POSITIONER SPIDER

## (undated) DEVICE — SUT 1 36IN PDS II VIO MONO

## (undated) DEVICE — TUBE SET INFLOW/OUTFLOW

## (undated) DEVICE — SUT FIBERLINK TAPE BLU WHT 1.3

## (undated) DEVICE — NDL SPINAL 18GX3.5 SPINOCAN

## (undated) DEVICE — BUTTON PASSPORT CANN 2X4CM

## (undated) DEVICE — PAD ABD 8X10 STERILE

## (undated) DEVICE — GLOVE SURGEON SYN PF SZ 9

## (undated) DEVICE — SUT MCRYL PLUS 4-0 PS2 27IN

## (undated) DEVICE — PAD SHOULDER CARE POLAR

## (undated) DEVICE — CLOSURE SKIN STERI STRIP 1/2X4

## (undated) DEVICE — GOWN SMART IMP BREATHABLE XXLG

## (undated) DEVICE — SOL 9P NACL IRR PIC IL

## (undated) DEVICE — DRAPE STERI U-SHAPED 47X51IN

## (undated) DEVICE — TUBING SUC UNIV W/CONN 12FT

## (undated) DEVICE — SUT VICRYL PLUS 0 CT1 18IN

## (undated) DEVICE — BNDG COFLEX FOAM LF2 ST 6X5YD

## (undated) DEVICE — SEE MEDLINE ITEM 157117

## (undated) DEVICE — SUT FIBERWIRE 0 38 W/NDL

## (undated) DEVICE — DRAPE STERI INSTRUMENT 1018

## (undated) DEVICE — DRESSING XEROFORM FOIL PK 1X8

## (undated) DEVICE — SOL IRR NACL .9% 3000ML

## (undated) DEVICE — PAD ELECTRODE STER 1.5X3

## (undated) DEVICE — CONNECTOR TUBING STR 5 IN 1

## (undated) DEVICE — SHOULDER POSITION KIT

## (undated) DEVICE — BLADE SHAVER 4.5 6/BX

## (undated) DEVICE — SYR 10CC LUER LOCK

## (undated) DEVICE — GLOVE BIOGEL SKINSENSE PI 7.0

## (undated) DEVICE — APPLICATOR CHLORAPREP ORN 26ML

## (undated) DEVICE — SUPPORT SLING SHOT II MEDIUM

## (undated) DEVICE — GRAFT SPREADER